# Patient Record
Sex: MALE | Race: WHITE | NOT HISPANIC OR LATINO | Employment: FULL TIME | URBAN - METROPOLITAN AREA
[De-identification: names, ages, dates, MRNs, and addresses within clinical notes are randomized per-mention and may not be internally consistent; named-entity substitution may affect disease eponyms.]

---

## 2017-02-01 LAB
ALBUMIN SERPL-MCNC: 4.3 G/DL (ref 3.5–5.5)
ALBUMIN/GLOB SERPL: 1.4 {RATIO} (ref 1.1–2.5)
ALP SERPL-CCNC: 71 IU/L (ref 39–117)
ALT SERPL-CCNC: 35 IU/L (ref 0–44)
AST SERPL-CCNC: 72 IU/L (ref 0–40)
BASOPHILS # BLD AUTO: 0 X10E3/UL (ref 0–0.2)
BASOPHILS NFR BLD AUTO: 0 %
BILIRUB SERPL-MCNC: 0.5 MG/DL (ref 0–1.2)
BUN SERPL-MCNC: 15 MG/DL (ref 6–24)
BUN/CREAT SERPL: 19 (ref 9–20)
CALCIUM SERPL-MCNC: 9.4 MG/DL (ref 8.7–10.2)
CHLORIDE SERPL-SCNC: 101 MMOL/L (ref 96–106)
CHOLEST SERPL-MCNC: 158 MG/DL (ref 100–199)
CO2 SERPL-SCNC: 22 MMOL/L (ref 18–29)
COMMENT 011824: NORMAL
CREAT SERPL-MCNC: 0.8 MG/DL (ref 0.76–1.27)
EOSINOPHIL # BLD AUTO: 0.4 X10E3/UL (ref 0–0.4)
EOSINOPHIL NFR BLD AUTO: 5 %
ERYTHROCYTE [DISTWIDTH] IN BLOOD BY AUTOMATED COUNT: 14.3 % (ref 12.3–15.4)
GLOBULIN SER CALC-MCNC: 3 G/DL (ref 1.5–4.5)
GLUCOSE SERPL-MCNC: 104 MG/DL (ref 65–99)
HBA1C MFR BLD: 5.8 % (ref 4.8–5.6)
HCT VFR BLD AUTO: 42.1 % (ref 37.5–51)
HDLC SERPL-MCNC: 41 MG/DL
HGB BLD-MCNC: 14.1 G/DL (ref 12.6–17.7)
IMM GRANULOCYTES # BLD: 0 X10E3/UL (ref 0–0.1)
IMM GRANULOCYTES NFR BLD: 0 %
IMMATURE CELLS  115398: NORMAL
LDLC SERPL CALC-MCNC: 97 MG/DL (ref 0–99)
LYMPHOCYTES # BLD AUTO: 1.6 X10E3/UL (ref 0.7–3.1)
LYMPHOCYTES NFR BLD AUTO: 24 %
MCH RBC QN AUTO: 27.4 PG (ref 26.6–33)
MCHC RBC AUTO-ENTMCNC: 33.5 G/DL (ref 31.5–35.7)
MCV RBC AUTO: 82 FL (ref 79–97)
MONOCYTES # BLD AUTO: 0.6 X10E3/UL (ref 0.1–0.9)
MONOCYTES NFR BLD AUTO: 9 %
MORPHOLOGY BLD-IMP: NORMAL
NEUTROPHILS # BLD AUTO: 4.1 X10E3/UL (ref 1.4–7)
NEUTROPHILS NFR BLD AUTO: 62 %
NRBC BLD AUTO-RTO: NORMAL %
PLATELET # BLD AUTO: 168 X10E3/UL (ref 150–379)
POTASSIUM SERPL-SCNC: 4 MMOL/L (ref 3.5–5.2)
PROT SERPL-MCNC: 7.3 G/DL (ref 6–8.5)
RBC # BLD AUTO: 5.15 X10E6/UL (ref 4.14–5.8)
SODIUM SERPL-SCNC: 142 MMOL/L (ref 134–144)
TRIGL SERPL-MCNC: 101 MG/DL (ref 0–149)
VLDLC SERPL CALC-MCNC: 20 MG/DL (ref 5–40)
WBC # BLD AUTO: 6.8 X10E3/UL (ref 3.4–10.8)

## 2017-02-01 NOTE — PROGRESS NOTES
Quick Note:    I will discuss the result in upcoming appointment.     Veronica Lockhart MD    ______

## 2017-02-06 ENCOUNTER — OFFICE VISIT (OUTPATIENT)
Dept: MEDICAL GROUP | Age: 42
End: 2017-02-06
Payer: COMMERCIAL

## 2017-02-06 VITALS
WEIGHT: 307 LBS | BODY MASS INDEX: 43.95 KG/M2 | HEIGHT: 70 IN | OXYGEN SATURATION: 99 % | DIASTOLIC BLOOD PRESSURE: 88 MMHG | SYSTOLIC BLOOD PRESSURE: 122 MMHG | TEMPERATURE: 97.3 F | HEART RATE: 89 BPM

## 2017-02-06 DIAGNOSIS — R21 PERIANAL RASH: ICD-10-CM

## 2017-02-06 DIAGNOSIS — R73.03 PREDIABETES: ICD-10-CM

## 2017-02-06 DIAGNOSIS — E66.01 MORBID OBESITY WITH BMI OF 40.0-44.9, ADULT (HCC): ICD-10-CM

## 2017-02-06 DIAGNOSIS — R73.01 IFG (IMPAIRED FASTING GLUCOSE): ICD-10-CM

## 2017-02-06 DIAGNOSIS — E78.00 PURE HYPERCHOLESTEROLEMIA: ICD-10-CM

## 2017-02-06 DIAGNOSIS — I10 ESSENTIAL HYPERTENSION: ICD-10-CM

## 2017-02-06 DIAGNOSIS — K21.9 GASTROESOPHAGEAL REFLUX DISEASE WITHOUT ESOPHAGITIS: ICD-10-CM

## 2017-02-06 PROCEDURE — 99214 OFFICE O/P EST MOD 30 MIN: CPT | Performed by: INTERNAL MEDICINE

## 2017-02-06 RX ORDER — OMEPRAZOLE 40 MG/1
40 CAPSULE, DELAYED RELEASE ORAL DAILY
Refills: 4 | COMMUNITY
Start: 2017-01-31 | End: 2018-04-12

## 2017-02-06 RX ORDER — SIMVASTATIN 10 MG
10 TABLET ORAL EVERY EVENING
Qty: 90 TAB | Refills: 3 | Status: SHIPPED | OUTPATIENT
Start: 2017-02-06

## 2017-02-06 RX ORDER — SUCRALFATE 1 G/1
TABLET ORAL
Refills: 4 | COMMUNITY
Start: 2017-01-31 | End: 2018-06-21

## 2017-02-06 ASSESSMENT — PATIENT HEALTH QUESTIONNAIRE - PHQ9: CLINICAL INTERPRETATION OF PHQ2 SCORE: 0

## 2017-02-06 ASSESSMENT — PAIN SCALES - GENERAL: PAINLEVEL: NO PAIN

## 2017-02-06 NOTE — MR AVS SNAPSHOT
"Jose De Jesus Jenkins   2017 2:00 PM   Office Visit   MRN: [id number]    Department:  [address] [place] [address]   Dept Phone:  [phone]    Description:  Male : 1975   Provider:  Veronica Lockhart M.D.           Reason for Visit     Follow-Up lab review    Rash x perianal area 2 months      Allergies as of 2017     Allergen Noted Reactions    Shellfish Allergy 10/03/2012       No Known Drug Allergy 2010         You were diagnosed with     Pure hypercholesterolemia   [272.0.ICD-9-CM]       Gastroesophageal reflux disease without esophagitis   [971441]       Prediabetes   [763451]       Essential hypertension   [2024318]       Morbid obesity with BMI of 40.0-44.9, adult (CMS-HCC)   [479123]       IFG (impaired fasting glucose)   [740588]         Vital Signs     Blood Pressure Pulse Temperature Height Weight Body Mass Index    122/88 mmHg 113 36.3 °C (97.4 °F) 1.778 m (5' 10\") 139.254 kg (307 lb) 44.05 kg/m2    Oxygen Saturation Smoking Status                99% Never Smoker           Basic Information     Date Of Birth Sex Race Ethnicity Preferred Language    1975 Male White Non- English      Your appointments     Aug 07, 2017  1:00 PM   Established Patient with Veronica Lockhart M.D.   [address] [id number] [phone]           You will be receiving a confirmation call a few days before your appointment from our automated call confirmation system.              Problem List              ICD-10-CM Priority Class Noted - Resolved    Deviated nasal septum J34.2   10/24/2012 - Present    Pure hypercholesterolemia E78.00   2015 - Present    BENITA on CPAP G47.33   2015 - Present    Morbid obesity with BMI of 40.0-44.9, adult (CMS-HCC) E66.01, Z68.41   2015 - Present    Family history of diabetes mellitus Z83.3   2015 - Present    Prediabetes R73.03   2015 - Present    Essential hypertension I10   " 4/4/2016 - Present    Chronic allergic rhinitis J30.9   11/3/2016 - Present    Gastroesophageal reflux disease without esophagitis K21.9   2/6/2017 - Present      Health Maintenance        Date Due Completion Dates    IMM DTaP/Tdap/Td Vaccine (2 - Td) 11/6/2025 11/6/2015            Current Immunizations     Influenza Vaccine Quad Inj (Pf) 11/6/2015    Influenza Vaccine Quad Inj (Preserved) 11/3/2016  1:45 PM    Tdap Vaccine 11/6/2015      Below and/or attached are the medications your provider expects you to take. Review all of your home medications and newly ordered medications with your provider and/or pharmacist. Follow medication instructions as directed by your provider and/or pharmacist. Please keep your medication list with you and share with your provider. Update the information when medications are discontinued, doses are changed, or new medications (including over-the-counter products) are added; and carry medication information at all times in the event of emergency situations     Allergies:  SHELLFISH ALLERGY - (reactions not documented)     NO KNOWN DRUG ALLERGY - (reactions not documented)               Medications  Valid as of: February 06, 2017 -  2:48 PM    Generic Name Brand Name Tablet Size Instructions for use    Ascorbic Acid (Tab) Vitamin C 1000 MG Take  by mouth every day.          Fluticasone Propionate HFA (Aerosol) FLOVENT  MCG/ACT Inhale 2 Puffs by mouth 2 times a day.          Lisinopril-Hydrochlorothiazide (Tab) PRINZIDE, ZESTORETIC 10-12.5 MG Take 1 Tab by mouth every day.        Omeprazole (CAPSULE DELAYED RELEASE) PRILOSEC 40 MG Take 40 mg by mouth every day.        Simvastatin (Tab) ZOCOR 20 MG TAKE 1 TAB BY MOUTH EVERY EVENING.        Sucralfate (Tab) CARAFATE 1 GM TAKE 1 TABLET BY MOUTH (MIX WITH WATER TO MAKE A SLURRY) 4 TIMES A DAY FOR 5 WEEKS        .                 Medicines prescribed today were sent to:     [facility] #[id number] - [place] - [address]    [address] [address]    Phone: [phone] Fax: [phone]    Open 24 Hours?: No      Medication refill instructions:       If your prescription bottle indicates you have medication refills left, it is not necessary to call your provider’s office. Please contact your pharmacy and they will refill your medication.    If your prescription bottle indicates you do not have any refills left, you may request refills at any time through one of the following ways: The online Filtr8 system (except Urgent Care), by calling your provider’s office, or by asking your pharmacy to contact your provider’s office with a refill request. Medication refills are processed only during regular business hours and may not be available until the next business day. Your provider may request additional information or to have a follow-up visit with you prior to refilling your medication.   *Please Note: Medication refills are assigned a new Rx number when refilled electronically. Your pharmacy may indicate that no refills were authorized even though a new prescription for the same medication is available at the pharmacy. Please request the medicine by name with the pharmacy before contacting your provider for a refill.        Your To Do List     Future Labs/Procedures Complete By Expires    COMP METABOLIC PANEL  As directed 2/7/2018    HEMOGLOBIN A1C  As directed 2/7/2018    LIPID PROFILE  As directed 2/7/2018         Filtr8 Access Code: Activation code not generated  Current Filtr8 Status: Active

## 2017-02-06 NOTE — ASSESSMENT & PLAN NOTE
Patient reported rash around perianal area for 2 months. He feels pain when he wipes after bowel movement. He noticed black spot on the toilet paper but denied blood on toilet bowl or bloody bowel movement. He denied constipation. He uses over-the-counter Neosporin last week.

## 2017-02-06 NOTE — ASSESSMENT & PLAN NOTE
His blood pressure is well-controlled with lisinopril-hydrochlorothiazide 10-12.5 mg daily. Electrolytes and renal function are within normal. He denies side effects from taking it.

## 2017-02-06 NOTE — Clinical Note
[facility]  Veronica Lockhart M.D.  [address]  [place] [address]  Fax: [phone] Authorization for Release/Disclosure of Protected Health Information   Name: LEIGH NASCIMENTO : 1975 SSN: [id number]   Address: [place] [address] Phone:    [phone] (home)    I authorize the entity listed below to release/disclose the PHI below to [facility]/Veronica Lockhart M.D.   Provider or Entity Name:    [facility]   Address   City, State, [facility]   Phone:      Fax:  [phone]     Reason for request: continuity of care   Information to be released: Last Endoscopy   [  ] LAST COLONOSCOPY, including any PATH REPORT [  ] LAST DEXA  [  ] LAST MAMMOGRAM  [  ] LAST PAP [  ] RETINA EXAM REPORT  [  ] IMMUNIZATION RECORDS  [  ] Release all info      [  ] Check here and initial the line next to each item to release ALL health information INCLUDING  _____ Care and treatment for drug and / or alcohol abuse  _____ HIV testing, infection status, or AIDS  _____ Genetic Testing    DATES OF SERVICE OR TIME PERIOD TO BE DISCLOSED: _____2017________  I understand and acknowledge that:  * This Authorization may be revoked at any time by you in writing, except if your health information has already been used or disclosed.  * Your health information that will be used or disclosed as a result of you signing this authorization could be re-disclosed by the recipient. If this occurs, your re-disclosed health information may no longer be protected by State or Federal laws.  * You may refuse to sign this Authorization. Your refusal will not affect your ability to obtain treatment.  * This Authorization becomes effective upon signing and will  on (date) __________. If no date is indicated, this Authorization will  one (1) year from the signature date.    Name: Leigh Nascimento    Signature:     Date: 2017

## 2017-02-06 NOTE — ASSESSMENT & PLAN NOTE
Patient has EGD and dilation with [facility] on 1/31/17. He was prescribed omeprazole and sucralfate from [facility]. He is taking omeprazole 40 mg every morning and sucralfate 1 g twice a day. He reported that he swallows better after dilation. He has EGD 3 times since 2011.

## 2017-02-06 NOTE — ASSESSMENT & PLAN NOTE
Patient is taking simvastatin 20 mg every afternoon. He denies side effects from taking it. He has slightly elevated AST at 72 on 1/31/17. His cholesterol is well-controlled with current regimens and diet and exercise. We discussed to cut down simvastatin to 10 mg every afternoon. Patient agreed with the plan.    Results for LEIGH NASCIMENTO (MRN [id number]) as of 2/6/2017 15:24   Ref. Range 1/31/2017 08:10   Cholesterol,Tot Latest Ref Range: 100-199 mg/dL 158   Triglycerides Latest Ref Range: 0-149 mg/dL 101   HDL Latest Ref Range: >39 mg/dL 41   LDL Latest Ref Range: 0-99 mg/dL 97   VLDL Cholesterol Calc Latest Ref Range: 5-40 mg/dL 20

## 2017-02-06 NOTE — ASSESSMENT & PLAN NOTE
His fasting blood sugar improved to 104 and A1c 5.8 on 1/31/17. He has been controlled carbohydrate intake.

## 2017-02-06 NOTE — ASSESSMENT & PLAN NOTE
Patient had 45 minutes of cardio exercise 5-6 times a week. He lost 10-12 pounds since August 2016. He also tried to eat healthy diet.

## 2017-02-07 NOTE — PROGRESS NOTES
Subjective:   Jose De Jesus Nascimento is a 41 y.o. male here today for evaluation and management of:      Pure hypercholesterolemia  Patient is taking simvastatin 20 mg every afternoon. He denies side effects from taking it. He has slightly elevated AST at 72 on 1/31/17. His cholesterol is well-controlled with current regimens and diet and exercise. We discussed to cut down simvastatin to 10 mg every afternoon. Patient agreed with the plan.    Results for JOSE DE JESUS NASCIMENTO (MRN [id number]) as of 2/6/2017 15:24   Ref. Range 1/31/2017 08:10   Cholesterol,Tot Latest Ref Range: 100-199 mg/dL 158   Triglycerides Latest Ref Range: 0-149 mg/dL 101   HDL Latest Ref Range: >39 mg/dL 41   LDL Latest Ref Range: 0-99 mg/dL 97   VLDL Cholesterol Calc Latest Ref Range: 5-40 mg/dL 20       Morbid obesity with BMI of 40.0-44.9, adult (CMS-HCC)  Patient had 45 minutes of cardio exercise 5-6 times a week. He lost 10-12 pounds since August 2016. He also tried to eat healthy diet.    Prediabetes  His fasting blood sugar improved to 104 and A1c 5.8 on 1/31/17. He has been controlled carbohydrate intake.    Essential hypertension  His blood pressure is well-controlled with lisinopril-hydrochlorothiazide 10-12.5 mg daily. Electrolytes and renal function are within normal. He denies side effects from taking it.    Gastroesophageal reflux disease without esophagitis  Patient has EGD and dilation with [facility] on 1/31/17. He was prescribed omeprazole and sucralfate from [facility]. He is taking omeprazole 40 mg every morning and sucralfate 1 g twice a day. He reported that he swallows better after dilation. He has EGD 3 times since 2011.    Perianal rash  Patient reported rash around perianal area for 2 months. He feels pain when he wipes after bowel movement. He noticed black spot on the toilet paper but denied blood on toilet bowl or bloody bowel movement. He denied constipation. He uses over-the-counter Neosporin last week.      "      Current medicines (including changes today)  Current Outpatient Prescriptions   Medication Sig Dispense Refill   • omeprazole (PRILOSEC) 40 MG delayed-release capsule Take 40 mg by mouth every day.  4   • sucralfate (CARAFATE) 1 GM Tab TAKE 1 TABLET BY MOUTH (MIX WITH WATER TO MAKE A SLURRY) 4 TIMES A DAY FOR 5 WEEKS  4   • simvastatin (ZOCOR) 10 MG Tab Take 1 Tab by mouth every evening. TAKE 1 TAB BY MOUTH EVERY EVENING. 90 Tab 3   • lisinopril-hydrochlorothiazide (PRINZIDE, ZESTORETIC) 10-12.5 MG per tablet Take 1 Tab by mouth every day. 30 Tab 6   • Ascorbic Acid (VITAMIN C) 1000 MG TABS Take  by mouth every day.       • fluticasone (FLOVENT HFA) 220 MCG/ACT AERO Inhale 2 Puffs by mouth 2 times a day.         No current facility-administered medications for this visit.     He  has a past medical history of Seasonal allergies; Hyperlipidemia; and Snoring.    ROS   No chest pain, no shortness of breath, no abdominal pain       Objective:     Blood pressure 122/88, pulse 89, temperature 36.3 °C (97.3 °F), height 1.778 m (5' 10\"), weight 139.254 kg (307 lb), SpO2 99 %. Body mass index is 44.05 kg/(m^2).   Physical Exam:  General: Alert, oriented and no acute distress.  Eye contact is good, speech goal directed, affect calm  HEENT: conjunctiva non-injected, sclera non-icteric.  Oral mucous membranes pink and moist with no lesions.  Pinna normal.  Lungs: Normal respiratory effort, clear to auscultation bilaterally with good excursion.  CV: regular rate and rhythm. No murmurs.  Abdomen: soft, non distended, nontender, Bowel sound normal.  Ext: no edema, color normal, vascularity normal, temperature normal  Perianal exam: mild erythematous rash around anal region, small superficial tear at 12 o' clock position above the anus, no anal fissure or fistula.     Assessment and Plan:   The following treatment plan was discussed     1. Pure hypercholesterolemia  - Cholesterol improved. Decrease the dose of simvastatin " from 20-10 mg every afternoon due to slightly elevated AST. Recommended to avoid alcohol.  - Advised to eat low fat, low carbohydrate and high fiber diet as well as do cardio physical exercise regularly.   - COMP METABOLIC PANEL; Future  - LIPID PROFILE; Future  - simvastatin (ZOCOR) 10 MG Tab; Take 1 Tab by mouth every evening. TAKE 1 TAB BY MOUTH EVERY EVENING.  Dispense: 90 Tab; Refill: 3    2. Gastroesophageal reflux disease without esophagitis  - Status post EGD and dilation on 1/31/17 with digestive health. Continue omeprazole and sucralfate as instructed by digestive health.  - Recommend to avoid acidic food and eating late in the evening. Elevated head of the bed.  - Encouraged to lose weight.  - COMP METABOLIC PANEL; Future    3. Prediabetes  - A1c improved. Counseling for diabetes diet and regular exercise. Will recheck lab in 6 months.  - HEMOGLOBIN A1C; Future    4. Essential hypertension  - Well-controlled. Continue current regimens. Recheck lab 1-2 weeks before next follow up visit.  - COMP METABOLIC PANEL; Future    5. Morbid obesity with BMI of 40.0-44.9, adult (CMS-[facility])  - Patient lost some weight from diet and exercise. Counseled for healthy diet and regular physical exercise to lose weight. Discussed to restrict calorie intake.  - Patient identified as having weight management issue.  Appropriate orders and counseling given.    6. IFG (impaired fasting glucose)  - HEMOGLOBIN A1C; Future    7. Perianal rash  - No signs of infection. It is likely due to moisture and body habitus.  - He declined to use hydrocortisone cream. He wanted to try Neosporin for superficial tear on perianal region.  - Discussed to do Sitz bath, wash with soap and water after bowel movement. He can try Neosporin topical.   - Advised to keep perianal dry and clean.       Followup: Return in about 6 months (around 8/6/2017), or if symptoms worsen or fail to improve, for hypertension, hypercholesterolemia, prediabetic, obesity,  sleep apnea, GERD.      Please note that this dictation was created using voice recognition software. I have made every reasonable attempt to correct obvious errors, but I expect that there may have unintended errors in text, spelling, punctuation, or grammar that I did not discover.

## 2017-05-20 ENCOUNTER — HOSPITAL ENCOUNTER (OUTPATIENT)
Dept: RADIOLOGY | Facility: MEDICAL CENTER | Age: 42
End: 2017-05-20
Attending: SURGERY
Payer: COMMERCIAL

## 2017-05-20 DIAGNOSIS — S46.011A STRAIN OF MUSCLE(S) AND TENDON(S) OF THE ROTATOR CUFF OF RIGHT SHOULDER, INITIAL ENCOUNTER: ICD-10-CM

## 2017-05-20 DIAGNOSIS — M25.511 RIGHT SHOULDER PAIN, UNSPECIFIED CHRONICITY: ICD-10-CM

## 2017-05-20 DIAGNOSIS — M75.41 IMPINGEMENT SYNDROME OF RIGHT SHOULDER: ICD-10-CM

## 2017-05-20 PROCEDURE — 73221 MRI JOINT UPR EXTREM W/O DYE: CPT | Mod: RT

## 2017-05-22 DIAGNOSIS — I10 ESSENTIAL HYPERTENSION: ICD-10-CM

## 2017-05-22 RX ORDER — LISINOPRIL AND HYDROCHLOROTHIAZIDE 12.5; 1 MG/1; MG/1
TABLET ORAL
Qty: 30 TAB | Refills: 11 | Status: SHIPPED | OUTPATIENT
Start: 2017-05-22

## 2017-08-04 ENCOUNTER — TELEPHONE (OUTPATIENT)
Dept: MEDICAL GROUP | Age: 42
End: 2017-08-04

## 2017-11-03 ENCOUNTER — APPOINTMENT (OUTPATIENT)
Dept: SLEEP MEDICINE | Facility: MEDICAL CENTER | Age: 42
End: 2017-11-03
Payer: COMMERCIAL

## 2017-12-22 ENCOUNTER — SLEEP CENTER VISIT (OUTPATIENT)
Dept: SLEEP MEDICINE | Facility: MEDICAL CENTER | Age: 42
End: 2017-12-22
Payer: COMMERCIAL

## 2017-12-22 VITALS
WEIGHT: 315 LBS | HEART RATE: 76 BPM | SYSTOLIC BLOOD PRESSURE: 128 MMHG | RESPIRATION RATE: 15 BRPM | OXYGEN SATURATION: 94 % | HEIGHT: 70 IN | BODY MASS INDEX: 45.1 KG/M2 | DIASTOLIC BLOOD PRESSURE: 82 MMHG

## 2017-12-22 DIAGNOSIS — J34.2 DEVIATED NASAL SEPTUM: ICD-10-CM

## 2017-12-22 DIAGNOSIS — E66.01 MORBID OBESITY WITH BMI OF 40.0-44.9, ADULT (HCC): ICD-10-CM

## 2017-12-22 DIAGNOSIS — G47.33 OSA ON CPAP: ICD-10-CM

## 2017-12-22 DIAGNOSIS — J30.9 CHRONIC ALLERGIC RHINITIS, UNSPECIFIED SEASONALITY, UNSPECIFIED TRIGGER: ICD-10-CM

## 2017-12-22 PROCEDURE — 99203 OFFICE O/P NEW LOW 30 MIN: CPT | Performed by: NURSE PRACTITIONER

## 2017-12-22 NOTE — PROGRESS NOTES
Chief Complaint   Patient presents with   • New Patient     DR SOFIA / NEW PT          HPI: This patient is a 42 y.o. male, who presents To reestablish care for known sleep apnea. He is a former patient of Dr. Sofia. Prior UPPP.  His history of severe obstructive sleep apnea and is compliant with CPAP 11 cm H2O. Compliance download shows 100% usage for the past 30 days, average use of 7 hours 20 minutes per night, AHI reduced to 0.2. Patient's mask and pressures are comfortable. He feels he benefits from therapy. Weeks feeling rested, denies daytime hypersomnolence or a.m. headache. He is receiving supplies regularly through [facility].     He has chronic sinus disease, nasal congestion. He's had a prior nasal surgery with Dr. Asher which was ineffective. He uses saline rinse and nasal steroid with minimal improvement.      Past Medical History:   Diagnosis Date   • Apnea, sleep    • Asthma    • Frequent urination    • Hyperlipidemia    • Seasonal allergies    • Sleep apnea    • Snoring        Social History   Substance Use Topics   • Smoking status: Never Smoker   • Smokeless tobacco: Current User     Types: Chew   • Alcohol use Yes      Comment: 5 a week        Family History   Problem Relation Age of Onset   • Diabetes Mother      insulin-dependent diabetes   • Stroke Father    • Heart Disease Father      AICD placement   • Diabetes Daughter      Type I DM       Current medications as of today   Current Outpatient Prescriptions   Medication Sig Dispense Refill   • Loratadine (CLARITIN PO) Take  by mouth.     • lisinopril-hydrochlorothiazide (PRINZIDE, ZESTORETIC) 10-12.5 MG per tablet TAKE 1 TAB BY MOUTH EVERY DAY. 30 Tab 11   • simvastatin (ZOCOR) 10 MG Tab Take 1 Tab by mouth every evening. TAKE 1 TAB BY MOUTH EVERY EVENING. 90 Tab 3   • Ascorbic Acid (VITAMIN C) 1000 MG TABS Take  by mouth every day.       • omeprazole (PRILOSEC) 40 MG delayed-release capsule Take 40 mg by mouth every day.  4   • sucralfate  "(CARAFATE) 1 GM Tab TAKE 1 TABLET BY MOUTH (MIX WITH WATER TO MAKE A SLURRY) 4 TIMES A DAY FOR 5 WEEKS  4   • fluticasone (FLOVENT HFA) 220 MCG/ACT AERO Inhale 2 Puffs by mouth 2 times a day.         No current facility-administered medications for this visit.        Allergies: Shellfish allergy and No known drug allergy    Blood pressure 128/82, pulse 76, resp. rate 15, height 1.778 m (5' 10\"), weight (!) 147 kg (324 lb), SpO2 94 %.      ROS:   Constitutional: Denies fevers, chills, night sweats, weight loss or fatigue  Eyes: Denies pain, discharge/drainage  ENT: Denies tinnitus, hearing loss, hoarseness, epistaxis. Positive rhinitis and nasal congestion.  Respiratory: Denies cough, wheeze, dyspnea, hemoptysis  Cardiovascular: Denies chest pain, tightness, palpitations, orthopnea or edema  Sleep: See HPI  Musculoskeletal: Denies back pain, painful joints, sore muscles  Neurological: Denies vertigo or headaches  Skin: Denies rashes, lesions  Psychiatric: Denies depression or anxiety    Physical exam:   Constitutional: Obese, in no acute distress  Eyes: PERRL  Mouth/Throat: Oropharynx is moist, clear, no lesions, prior UPPP  Neck: supple, no masses  Respiratory: no intercostal retractions or accessory muscle use   Lungs auscultation: Clear to auscultation bilaterally  Cardiovascular: Regular rate rhythm no murmurs, rubs or gallops  Musculoskeletal:  no clubbing or cyanosis  Skin: No rashes or lesions noted on exposed skin  Neuro: No focal deficit noted  Psychiatric: Oriented to time, person and place.     Diagnosis:  1. Deviated nasal septum  REFERRAL TO ENT   2. Chronic allergic rhinitis, unspecified seasonality, unspecified trigger  REFERRAL TO ENT   3. BENITA on CPAP  DME MASK AND SUPPLIES   4. Morbid obesity with BMI of 40.0-44.9, adult (CMS-[facility])         Plan:  1.Continue CPAP nightly  2. Clean mask and tubing weekly  3. Prescription for supplies to [facility]  4. Referral to ENT  5. Follow up annually    "

## 2018-01-25 DIAGNOSIS — E78.00 PURE HYPERCHOLESTEROLEMIA: ICD-10-CM

## 2018-01-26 NOTE — TELEPHONE ENCOUNTER
I have not seen patient about a year. Please confirm with patient if he already establish to new PCP. I will hold off refill on simvastatin as he needs to refill it from his current PCP.

## 2018-01-30 RX ORDER — SIMVASTATIN 10 MG
TABLET ORAL
Qty: 90 TAB | Refills: 0 | OUTPATIENT
Start: 2018-01-30

## 2018-01-30 NOTE — TELEPHONE ENCOUNTER
Phone Number Called: [phone] (home)       Message: Spoke to patient regarding note below. Patient will be contacting PCP to get RX filled.    Left Message for patient to call back: no

## 2018-04-03 ENCOUNTER — HOSPITAL ENCOUNTER (OUTPATIENT)
Facility: MEDICAL CENTER | Age: 43
End: 2018-04-03
Attending: OTOLARYNGOLOGY | Admitting: OTOLARYNGOLOGY
Payer: COMMERCIAL

## 2018-04-12 VITALS — WEIGHT: 315 LBS | HEIGHT: 72 IN | BODY MASS INDEX: 42.66 KG/M2

## 2018-04-12 DIAGNOSIS — Z01.812 PRE-OPERATIVE LABORATORY EXAMINATION: ICD-10-CM

## 2018-04-12 DIAGNOSIS — Z01.810 PRE-OPERATIVE CARDIOVASCULAR EXAMINATION: ICD-10-CM

## 2018-04-12 LAB
ANION GAP SERPL CALC-SCNC: 9 MMOL/L (ref 0–11.9)
BUN SERPL-MCNC: 15 MG/DL (ref 8–22)
CALCIUM SERPL-MCNC: 10.5 MG/DL (ref 8.5–10.5)
CHLORIDE SERPL-SCNC: 103 MMOL/L (ref 96–112)
CO2 SERPL-SCNC: 23 MMOL/L (ref 20–33)
CREAT SERPL-MCNC: 0.71 MG/DL (ref 0.5–1.4)
EKG IMPRESSION: NORMAL
GLUCOSE SERPL-MCNC: 190 MG/DL (ref 65–99)
POTASSIUM SERPL-SCNC: 3.6 MMOL/L (ref 3.6–5.5)
SODIUM SERPL-SCNC: 135 MMOL/L (ref 135–145)

## 2018-04-12 PROCEDURE — 93010 ELECTROCARDIOGRAM REPORT: CPT | Performed by: INTERNAL MEDICINE

## 2018-04-12 PROCEDURE — 80048 BASIC METABOLIC PNL TOTAL CA: CPT

## 2018-04-12 PROCEDURE — 36415 COLL VENOUS BLD VENIPUNCTURE: CPT

## 2018-04-12 PROCEDURE — 93005 ELECTROCARDIOGRAM TRACING: CPT

## 2018-04-12 RX ORDER — IBUPROFEN 800 MG/1
800 TABLET ORAL EVERY 8 HOURS PRN
COMMUNITY
End: 2018-04-17 | Stop reason: HOSPADM

## 2018-04-12 RX ORDER — HYDROCODONE BITARTRATE AND ACETAMINOPHEN 5; 325 MG/1; MG/1
1-2 TABLET ORAL EVERY 4 HOURS PRN
COMMUNITY
End: 2018-04-17 | Stop reason: HOSPADM

## 2018-04-24 ENCOUNTER — APPOINTMENT (RX ONLY)
Dept: URBAN - METROPOLITAN AREA CLINIC 4 | Facility: CLINIC | Age: 43
Setting detail: DERMATOLOGY
End: 2018-04-24

## 2018-04-24 DIAGNOSIS — Z71.89 OTHER SPECIFIED COUNSELING: ICD-10-CM

## 2018-04-24 DIAGNOSIS — B36.0 PITYRIASIS VERSICOLOR: ICD-10-CM

## 2018-04-24 DIAGNOSIS — D22 MELANOCYTIC NEVI: ICD-10-CM

## 2018-04-24 DIAGNOSIS — D18.0 HEMANGIOMA: ICD-10-CM

## 2018-04-24 DIAGNOSIS — L81.4 OTHER MELANIN HYPERPIGMENTATION: ICD-10-CM

## 2018-04-24 PROBLEM — D22.5 MELANOCYTIC NEVI OF TRUNK: Status: ACTIVE | Noted: 2018-04-24

## 2018-04-24 PROBLEM — D22.61 MELANOCYTIC NEVI OF RIGHT UPPER LIMB, INCLUDING SHOULDER: Status: ACTIVE | Noted: 2018-04-24

## 2018-04-24 PROBLEM — D22.62 MELANOCYTIC NEVI OF LEFT UPPER LIMB, INCLUDING SHOULDER: Status: ACTIVE | Noted: 2018-04-24

## 2018-04-24 PROBLEM — D22.71 MELANOCYTIC NEVI OF RIGHT LOWER LIMB, INCLUDING HIP: Status: ACTIVE | Noted: 2018-04-24

## 2018-04-24 PROBLEM — E78.5 HYPERLIPIDEMIA, UNSPECIFIED: Status: ACTIVE | Noted: 2018-04-24

## 2018-04-24 PROBLEM — D18.01 HEMANGIOMA OF SKIN AND SUBCUTANEOUS TISSUE: Status: ACTIVE | Noted: 2018-04-24

## 2018-04-24 PROBLEM — D48.5 NEOPLASM OF UNCERTAIN BEHAVIOR OF SKIN: Status: ACTIVE | Noted: 2018-04-24

## 2018-04-24 PROBLEM — I10 ESSENTIAL (PRIMARY) HYPERTENSION: Status: ACTIVE | Noted: 2018-04-24

## 2018-04-24 PROBLEM — D22.72 MELANOCYTIC NEVI OF LEFT LOWER LIMB, INCLUDING HIP: Status: ACTIVE | Noted: 2018-04-24

## 2018-04-24 PROBLEM — D22.39 MELANOCYTIC NEVI OF OTHER PARTS OF FACE: Status: ACTIVE | Noted: 2018-04-24

## 2018-04-24 PROCEDURE — ? COUNSELING

## 2018-04-24 PROCEDURE — ? BIOPSY BY SHAVE METHOD

## 2018-04-24 PROCEDURE — 99203 OFFICE O/P NEW LOW 30 MIN: CPT | Mod: 25

## 2018-04-24 PROCEDURE — 11101: CPT

## 2018-04-24 PROCEDURE — 11100: CPT

## 2018-04-24 ASSESSMENT — LOCATION SIMPLE DESCRIPTION DERM
LOCATION SIMPLE: INFERIOR FOREHEAD
LOCATION SIMPLE: LEFT UPPER BACK
LOCATION SIMPLE: CHEST
LOCATION SIMPLE: ABDOMEN
LOCATION SIMPLE: RIGHT LOWER BACK
LOCATION SIMPLE: RIGHT UPPER ARM
LOCATION SIMPLE: LEFT UPPER ARM
LOCATION SIMPLE: RIGHT FOREARM
LOCATION SIMPLE: LEFT CALF
LOCATION SIMPLE: RIGHT CALF
LOCATION SIMPLE: LEFT ELBOW
LOCATION SIMPLE: UPPER BACK
LOCATION SIMPLE: LEFT CHEEK

## 2018-04-24 ASSESSMENT — LOCATION DETAILED DESCRIPTION DERM
LOCATION DETAILED: LEFT MEDIAL MALAR CHEEK
LOCATION DETAILED: SUPERIOR THORACIC SPINE
LOCATION DETAILED: RIGHT SUPERIOR MEDIAL MIDBACK
LOCATION DETAILED: LEFT DISTAL CALF
LOCATION DETAILED: LEFT MEDIAL INFERIOR CHEST
LOCATION DETAILED: LEFT MEDIAL UPPER BACK
LOCATION DETAILED: RIGHT PROXIMAL CALF
LOCATION DETAILED: LEFT ELBOW
LOCATION DETAILED: LEFT DISTAL POSTERIOR UPPER ARM
LOCATION DETAILED: STERNUM
LOCATION DETAILED: RIGHT PROXIMAL POSTERIOR UPPER ARM
LOCATION DETAILED: RIGHT PROXIMAL DORSAL FOREARM
LOCATION DETAILED: EPIGASTRIC SKIN
LOCATION DETAILED: INFERIOR MID FOREHEAD

## 2018-04-24 ASSESSMENT — LOCATION ZONE DERM
LOCATION ZONE: FACE
LOCATION ZONE: TRUNK
LOCATION ZONE: ARM
LOCATION ZONE: LEG

## 2018-04-24 NOTE — PROCEDURE: BIOPSY BY SHAVE METHOD
Bill 29451 For Specimen Handling/Conveyance To Laboratory?: no
Post-Care Instructions: I reviewed with the patient in detail post-care instructions. Patient is to keep the biopsy site dry overnight, and then apply bacitracin twice daily until healed. Patient may apply hydrogen peroxide soaks to remove any crusting.
X Size Of Lesion In Cm: 0
Consent: Written consent was obtained and risks were reviewed including but not limited to scarring, infection, bleeding, scabbing, incomplete removal, nerve damage and allergy to anesthesia.
Cryotherapy Text: The wound bed was treated with cryotherapy after the biopsy was performed.
Curettage Text: The wound bed was treated with curettage after the biopsy was performed.
Anesthesia Volume In Cc: 1
Was A Bandage Applied: Yes
Biopsy Type: H and E
Lab: [id number]
Anesthesia Type: 1% lidocaine with 1:100,000 epinephrine and 408mcg clindamycin/ml and a 1:10 solution of 8.4% sodium bicarbonate
Dressing: bandage
Notification Instructions: Patient will be notified of biopsy results. However, patient instructed to call the office if not contacted within 2 weeks.
Wound Care: Vaseline
Biopsy Method: Personna blade
Billing Type: Third-Party Bill
Silver Nitrate Text: The wound bed was treated with silver nitrate after the biopsy was performed.
Type Of Destruction Used: Curettage
Hemostasis: Drysol
Lab Facility: 
Detail Level: Detailed
Electrodesiccation Text: The wound bed was treated with electrodesiccation after the biopsy was performed.
Electrodesiccation And Curettage Text: The wound bed was treated with electrodesiccation and curettage after the biopsy was performed.

## 2018-04-27 ENCOUNTER — APPOINTMENT (OUTPATIENT)
Dept: RADIOLOGY | Facility: MEDICAL CENTER | Age: 43
End: 2018-04-27
Attending: FAMILY MEDICINE
Payer: COMMERCIAL

## 2018-05-02 ENCOUNTER — APPOINTMENT (RX ONLY)
Dept: URBAN - METROPOLITAN AREA CLINIC 4 | Facility: CLINIC | Age: 43
Setting detail: DERMATOLOGY
End: 2018-05-02

## 2018-05-02 ENCOUNTER — HOSPITAL ENCOUNTER (OUTPATIENT)
Dept: RADIOLOGY | Facility: MEDICAL CENTER | Age: 43
End: 2018-05-02
Attending: FAMILY MEDICINE
Payer: COMMERCIAL

## 2018-05-02 DIAGNOSIS — R94.31 ABNORMAL EKG: ICD-10-CM

## 2018-05-02 PROCEDURE — ? MOHS SURGERY PHONE CONSULTATION

## 2018-05-02 PROCEDURE — 93017 CV STRESS TEST TRACING ONLY: CPT | Performed by: FAMILY MEDICINE

## 2018-05-03 PROBLEM — C44.91 BASAL CELL CARCINOMA OF SKIN, UNSPECIFIED: Status: ACTIVE | Noted: 2018-05-03

## 2018-05-03 NOTE — PROCEDURES
DATE OF SERVICE:  05/02/2018    EXERCISE TOLERANCE TEST REPORT    ORDERED BY:  Dr. Jonathan Guillaume.    INDICATION FOR PROCEDURE:  Preoperative clearance.    PROCEDURE:  After informed consent, the patient was exercised according to   Zeke protocol.  He completed 7 minutes and 31 seconds of exercise and   achieved 100% of his maximum predicted heart rate.  His resting   electrocardiogram showed a normal sinus rhythm with LVH by voltage criteria   and right axis deviation.  There is nonspecific ST segment changes in leads   III and aVF.  The patient developed no chest pain during exercise tolerance   testing.  He did develop 1-2 mm of flat to downsloping ST segment depression   in II, III and aVF in addition to V5 and V6.  The test was felt to be negative   for angina, but positive for ischemic EKG changes.    IMPRESSION:  1.  Exercise tolerance test negative for angina, but positive for ischemic   electrocardiogram changes.  2.  Fair exercise tolerance.  3.  Hypertensive blood pressure response to exercise.  The patient's peak   blood pressure was not recorded in stage III.  However, in stage II his blood   pressure was 180/100.  4.  Patient had some mild resting EKG abnormalities inferiorly and a   hypertensive blood pressure response.  This may contribute to his ischemic EKG   changes.  However, I would consider further evaluation with stress   echocardiography or myocardial perfusion imaging.       ____________________________________     MD BERTO GARCIA / KATERYNA    DD:  05/03/2018 09:02:33  DT:  05/03/2018 09:36:05    D#:  [id number]  Job#:  [id number]

## 2018-05-03 NOTE — PROCEDURE: MOHS SURGERY PHONE CONSULTATION
Does The Patient Take Blood Thinners?: No
Pathology Accession #: [id number]
Has The Pathology Report Been Received?: Yes
Time Of Mohs Surgery: 11:15am
Which Antibiotic Do They Take For Surgical Prophylaxis?: Amoxicillin (2 grams)
Date Of Mohs Surgery: 05/15/2018
Patient's Insurance: [facility]
Office Location Of Mohs Surgery: [facility]
Patient Reported Location: right superior occipital scalp
Referring Provider: Mamadou Disla PA-C
Detail Level: Simple

## 2018-05-09 ENCOUNTER — HOSPITAL ENCOUNTER (OUTPATIENT)
Dept: RADIOLOGY | Facility: MEDICAL CENTER | Age: 43
End: 2018-05-09
Attending: INTERNAL MEDICINE
Payer: COMMERCIAL

## 2018-05-09 ENCOUNTER — OFFICE VISIT (OUTPATIENT)
Dept: CARDIOLOGY | Facility: MEDICAL CENTER | Age: 43
End: 2018-05-09
Payer: COMMERCIAL

## 2018-05-09 VITALS
OXYGEN SATURATION: 93 % | BODY MASS INDEX: 45.1 KG/M2 | HEART RATE: 68 BPM | DIASTOLIC BLOOD PRESSURE: 90 MMHG | WEIGHT: 315 LBS | SYSTOLIC BLOOD PRESSURE: 122 MMHG | HEIGHT: 70 IN

## 2018-05-09 DIAGNOSIS — E78.00 PURE HYPERCHOLESTEROLEMIA: ICD-10-CM

## 2018-05-09 DIAGNOSIS — R94.39 ABNORMAL STRESS ECG WITH TREADMILL: ICD-10-CM

## 2018-05-09 DIAGNOSIS — R73.03 PREDIABETES: ICD-10-CM

## 2018-05-09 DIAGNOSIS — I10 ESSENTIAL HYPERTENSION: ICD-10-CM

## 2018-05-09 DIAGNOSIS — E66.01 MORBID OBESITY WITH BMI OF 40.0-44.9, ADULT (HCC): ICD-10-CM

## 2018-05-09 PROCEDURE — A9502 TC99M TETROFOSMIN: HCPCS

## 2018-05-09 PROCEDURE — 99204 OFFICE O/P NEW MOD 45 MIN: CPT | Performed by: INTERNAL MEDICINE

## 2018-05-09 RX ORDER — HYDROCODONE BITARTRATE AND ACETAMINOPHEN 5; 325 MG/1; MG/1
1-2 TABLET ORAL EVERY 4 HOURS PRN
Status: ON HOLD | COMMUNITY
End: 2018-08-15

## 2018-05-09 RX ORDER — IBUPROFEN 800 MG/1
800 TABLET ORAL EVERY 8 HOURS PRN
COMMUNITY

## 2018-05-09 ASSESSMENT — ENCOUNTER SYMPTOMS
DEPRESSION: 0
BLURRED VISION: 0
COUGH: 0
SHORTNESS OF BREATH: 0
FEVER: 0
PALPITATIONS: 0
PND: 0
HEADACHES: 0
BRUISES/BLEEDS EASILY: 0
NERVOUS/ANXIOUS: 0
NAUSEA: 0
HEARTBURN: 0
CHILLS: 0
EYE DISCHARGE: 0
MYALGIAS: 0
DIZZINESS: 0

## 2018-05-09 NOTE — LETTER
[facility]   [address]   [address]  [place] [address]  Phone: [phone]  Fax: [phone]              Jose De Jesus Fry Alice  1975    Encounter Date: 5/9/2018    Ashish Peace M.D.          PROGRESS NOTE:  No notes on file      Jonathan Guillaume M.D.  [address] [place] [id number]  VIA Facsimile: [phone]

## 2018-05-09 NOTE — PROGRESS NOTES
"No chief complaint on file.      Subjective:   Jose De Jesus Jenkins is a 43 y.o. male who presents today in consultation at the request of  for abnormal stress test.  This patient was scheduled for surgery for deviated nasal septum in hopes of getting off CPAP for sleep apnea.  Resting EKG demonstrated abnormal ST-T waves in leads 3 and aVF and borderline right axis deviation  The stress test demonstrated exercise-induced ST segment depression with persisting ST depression in the recovery phase.  The patient reports no symptoms that would suggest coronary heart disease.  He is quite compliant with CPAP for sleep apnea.  Is treated for hypertension.  Is treated for mild hypercholesterolemia.  pretreatment LDL is 149. Post treatment LDL is .    He has a busy  and has a family with children and does not do much physical exercise.  He states he gets \"a little short of breath\" when playing with his children.    Social history: No tobacco no alcohol abuse. He is . He is an . His children has type I diabetes  Past Medical History:   Diagnosis Date   • Apnea, sleep 04/2018    cpap in use   • Asthma    • Frequent urination    • Hyperlipidemia    • Seasonal allergies    • Sleep apnea    • Snoring      Past Surgical History:   Procedure Laterality Date   • SEPTOTURBINOPLASTY  10/24/2012    Performed by Jacinto Asher M.D. at SURGERY [facility]   • NASAL RECONSTRUCTION  10/24/2012    Performed by Jacinto Asher M.D. at SURGERY [facility]   • TONSILLECTOMY AND ADENOIDECTOMY  2009    [facility]     Family History   Problem Relation Age of Onset   • Diabetes Mother      insulin-dependent diabetes   • Stroke Father    • Heart Disease Father      AICD placement   • Diabetes Daughter      Type I DM     Social History     Social History   • Marital status:      Spouse name: N/A   • Number of children: N/A   • Years of education: N/A     Occupational History   • Not on file. "     Social History Main Topics   • Smoking status: Never Smoker   • Smokeless tobacco: Current User     Types: Chew   • Alcohol use Yes      Comment: 1 per week   • Drug use: No   • Sexual activity: Yes     Partners: Female     Other Topics Concern   • Not on file     Social History Narrative   • No narrative on file     Allergies   Allergen Reactions   • Shellfish Allergy Anaphylaxis   • No Known Drug Allergy      Outpatient Encounter Prescriptions as of 5/9/2018   Medication Sig Dispense Refill   • Triamcinolone Acetonide (NASACORT ALLERGY 24HR NA) Spray  in nose.     • multivitamin (THERAGRAN) Tab Take 1 Tab by mouth every day.     • Ascorbic Acid (VITAMIN C PO) Take  by mouth.     • HYDROcodone-acetaminophen (NORCO) 5-325 MG Tab per tablet Take 1-2 Tabs by mouth every four hours as needed.     • ibuprofen (MOTRIN) 800 MG Tab Take 800 mg by mouth every 8 hours as needed.     • Loratadine (CLARITIN PO) Take  by mouth.     • lisinopril-hydrochlorothiazide (PRINZIDE, ZESTORETIC) 10-12.5 MG per tablet TAKE 1 TAB BY MOUTH EVERY DAY. 30 Tab 11   • sucralfate (CARAFATE) 1 GM Tab TAKE 1 TABLET BY MOUTH (MIX WITH WATER TO MAKE A SLURRY) 4 TIMES A DAY FOR 5 WEEKS  4   • simvastatin (ZOCOR) 10 MG Tab Take 1 Tab by mouth every evening. TAKE 1 TAB BY MOUTH EVERY EVENING. 90 Tab 3   • fluticasone (FLOVENT HFA) 220 MCG/ACT AERO Inhale 2 Puffs by mouth 2 times a day.         No facility-administered encounter medications on file as of 5/9/2018.      Review of Systems   Constitutional: Negative for chills, fever and malaise/fatigue.   Eyes: Negative for blurred vision and discharge.   Respiratory: Negative for cough and shortness of breath.    Cardiovascular: Negative for chest pain, palpitations, leg swelling and PND.   Gastrointestinal: Negative for heartburn and nausea.   Genitourinary: Negative for dysuria and urgency.   Musculoskeletal: Negative for myalgias.   Skin: Negative for itching and rash.   Neurological: Negative  "for dizziness and headaches.   Endo/Heme/Allergies: Negative for environmental allergies. Does not bruise/bleed easily.   Psychiatric/Behavioral: Negative for depression. The patient is not nervous/anxious.         Objective:   /90   Pulse 68   Ht 1.778 m (5' 10\")   Wt (!) 144.7 kg (319 lb)   SpO2 93%   BMI 45.77 kg/m²     Physical Exam   Constitutional: He is oriented to person, place, and time.   Pleasant severely obese man with short neck   Neck: No JVD present.   Cardiovascular: Normal rate and regular rhythm.  Exam reveals no gallop and no friction rub.    No murmur heard.  Pulmonary/Chest: Effort normal and breath sounds normal.   Abdominal: Soft. Bowel sounds are normal.   Musculoskeletal: He exhibits no edema.   Neurological: He is alert and oriented to person, place, and time.   Skin: Skin is warm and dry.       Assessment:     1. Abnormal stress ECG with treadmill  NM-CARDIAC STRESS TEST    ECHOCARDIOGRAM COMP W/O CONT   2. Morbid obesity with BMI of 40.0-44.9, adult (CMS-[facility])     3. Pure hypercholesterolemia     4. Prediabetes     5. Essential hypertension         Medical Decision Making:  Today's Assessment / Status / Plan:   He is scheduled for treadmill myocardial perfusion imaging and echocardiogram with close follow-up thereafter.  Thank you for this consultation.  "

## 2018-05-10 ENCOUNTER — HOSPITAL ENCOUNTER (OUTPATIENT)
Dept: CARDIOLOGY | Facility: MEDICAL CENTER | Age: 43
End: 2018-05-10
Attending: INTERNAL MEDICINE
Payer: COMMERCIAL

## 2018-05-10 DIAGNOSIS — R94.39 ABNORMAL STRESS ECG WITH TREADMILL: ICD-10-CM

## 2018-05-10 LAB
LV EJECT FRACT MOD 2C 99903: 55.97
LV EJECT FRACT MOD 4C 99902: 60.37
LV EJECT FRACT MOD BP 99901: 57.06

## 2018-05-10 PROCEDURE — 93306 TTE W/DOPPLER COMPLETE: CPT | Mod: 26 | Performed by: INTERNAL MEDICINE

## 2018-05-10 PROCEDURE — 93306 TTE W/DOPPLER COMPLETE: CPT

## 2018-05-14 ENCOUNTER — OFFICE VISIT (OUTPATIENT)
Dept: CARDIOLOGY | Facility: MEDICAL CENTER | Age: 43
End: 2018-05-14
Payer: COMMERCIAL

## 2018-05-14 VITALS
HEART RATE: 74 BPM | OXYGEN SATURATION: 96 % | WEIGHT: 315 LBS | BODY MASS INDEX: 45.1 KG/M2 | DIASTOLIC BLOOD PRESSURE: 70 MMHG | SYSTOLIC BLOOD PRESSURE: 122 MMHG | HEIGHT: 70 IN

## 2018-05-14 DIAGNOSIS — E66.01 MORBID OBESITY WITH BMI OF 40.0-44.9, ADULT (HCC): ICD-10-CM

## 2018-05-14 DIAGNOSIS — R94.39 ABNORMAL STRESS ECG WITH TREADMILL: ICD-10-CM

## 2018-05-14 DIAGNOSIS — I10 ESSENTIAL HYPERTENSION: ICD-10-CM

## 2018-05-14 PROCEDURE — 99212 OFFICE O/P EST SF 10 MIN: CPT | Performed by: INTERNAL MEDICINE

## 2018-05-14 ASSESSMENT — ENCOUNTER SYMPTOMS
PND: 0
COUGH: 0
FEVER: 0
HEARTBURN: 0
BLURRED VISION: 0
DEPRESSION: 0
NAUSEA: 0
MYALGIAS: 0
SHORTNESS OF BREATH: 0
PALPITATIONS: 0
EYE DISCHARGE: 0
HEADACHES: 0
NERVOUS/ANXIOUS: 0
BRUISES/BLEEDS EASILY: 0
DIZZINESS: 0
CHILLS: 0

## 2018-05-14 NOTE — PROGRESS NOTES
Chief Complaint   Patient presents with   • Preoperative CV Eval       Subjective:   Jose De Jesus Jenkins is a 43 y.o. male who presents today for follow-up for abnormal stress EKG.  Subsequent testing revealed a normal treadmill MPI and normal echocardiogram.  He is therefore cleared for sinus surgery.    Past Medical History:   Diagnosis Date   • Apnea, sleep 04/2018    cpap in use   • Asthma    • Frequent urination    • Hyperlipidemia    • Seasonal allergies    • Sleep apnea    • Snoring      Past Surgical History:   Procedure Laterality Date   • SEPTOTURBINOPLASTY  10/24/2012    Performed by Jacinto Asher M.D. at SURGERY [facility]   • NASAL RECONSTRUCTION  10/24/2012    Performed by Jacinto Asher M.D. at SURGERY [facility]   • TONSILLECTOMY AND ADENOIDECTOMY  2009    [facility]     Family History   Problem Relation Age of Onset   • Diabetes Mother      insulin-dependent diabetes   • Stroke Father    • Heart Disease Father      AICD placement   • Diabetes Daughter      Type I DM     Social History     Social History   • Marital status:      Spouse name: N/A   • Number of children: N/A   • Years of education: N/A     Occupational History   • Not on file.     Social History Main Topics   • Smoking status: Never Smoker   • Smokeless tobacco: Current User     Types: Chew   • Alcohol use Yes      Comment: 1 per week   • Drug use: No   • Sexual activity: Yes     Partners: Female     Other Topics Concern   • Not on file     Social History Narrative   • No narrative on file     Allergies   Allergen Reactions   • Shellfish Allergy Anaphylaxis   • No Known Drug Allergy      Outpatient Encounter Prescriptions as of 5/14/2018   Medication Sig Dispense Refill   • Triamcinolone Acetonide (NASACORT ALLERGY 24HR NA) Spray  in nose.     • multivitamin (THERAGRAN) Tab Take 1 Tab by mouth every day.     • Ascorbic Acid (VITAMIN C PO) Take  by mouth.     • HYDROcodone-acetaminophen (NORCO) 5-325 MG Tab per tablet Take  "1-2 Tabs by mouth every four hours as needed.     • ibuprofen (MOTRIN) 800 MG Tab Take 800 mg by mouth every 8 hours as needed.     • Loratadine (CLARITIN PO) Take  by mouth.     • lisinopril-hydrochlorothiazide (PRINZIDE, ZESTORETIC) 10-12.5 MG per tablet TAKE 1 TAB BY MOUTH EVERY DAY. 30 Tab 11   • sucralfate (CARAFATE) 1 GM Tab TAKE 1 TABLET BY MOUTH (MIX WITH WATER TO MAKE A SLURRY) 4 TIMES A DAY FOR 5 WEEKS  4   • simvastatin (ZOCOR) 10 MG Tab Take 1 Tab by mouth every evening. TAKE 1 TAB BY MOUTH EVERY EVENING. 90 Tab 3   • fluticasone (FLOVENT HFA) 220 MCG/ACT AERO Inhale 2 Puffs by mouth 2 times a day.         No facility-administered encounter medications on file as of 5/14/2018.      Review of Systems   Constitutional: Negative for chills, fever and malaise/fatigue.   Eyes: Negative for blurred vision and discharge.   Respiratory: Negative for cough and shortness of breath.    Cardiovascular: Negative for chest pain, palpitations, leg swelling and PND.   Gastrointestinal: Negative for heartburn and nausea.   Genitourinary: Negative for dysuria and urgency.   Musculoskeletal: Negative for myalgias.   Skin: Negative for itching and rash.   Neurological: Negative for dizziness and headaches.   Endo/Heme/Allergies: Negative for environmental allergies. Does not bruise/bleed easily.   Psychiatric/Behavioral: Negative for depression. The patient is not nervous/anxious.         Objective:   /70   Pulse 74   Ht 1.778 m (5' 10\")   Wt (!) 144.7 kg (319 lb)   SpO2 96%   BMI 45.77 kg/m²     Physical Exam   Constitutional: He is oriented to person, place, and time.   Cardiovascular: Normal rate and regular rhythm.    Pulmonary/Chest: Effort normal.   Musculoskeletal: He exhibits no edema.   Neurological: He is alert and oriented to person, place, and time.   Skin: Skin is warm.       Assessment:     1. Abnormal stress ECG with treadmill     2. Morbid obesity with BMI of 40.0-44.9, adult (CMS-[facility])     3. " Essential hypertension         Medical Decision Making:  Today's Assessment / Status / Plan:   Low risk for sinus surgery and anesthesia.  Weight loss discussed  No need for follow-up here

## 2018-05-14 NOTE — LETTER
[facility]   [address]   [address]  [place] [address]  Phone: [phone]  Fax: [phone]              Jose De Jesus Jenkins  1975    Encounter Date: 5/14/2018    Ashish Peace M.D.          PROGRESS NOTE:  Chief Complaint   Patient presents with   • Preoperative CV Eval       Subjective:   Jose De Jesus Jenkins is a 43 y.o. male who presents today for follow-up for abnormal stress EKG.  Subsequent testing revealed a normal treadmill MPI and normal echocardiogram.  He is therefore cleared for sinus surgery.    Past Medical History:   Diagnosis Date   • Apnea, sleep 04/2018    cpap in use   • Asthma    • Frequent urination    • Hyperlipidemia    • Seasonal allergies    • Sleep apnea    • Snoring      Past Surgical History:   Procedure Laterality Date   • SEPTOTURBINOPLASTY  10/24/2012    Performed by Jacinto Asher M.D. at SURGERY [facility]   • NASAL RECONSTRUCTION  10/24/2012    Performed by Jacinto Asher M.D. at SURGERY [facility]   • TONSILLECTOMY AND ADENOIDECTOMY  2009    [facility]     Family History   Problem Relation Age of Onset   • Diabetes Mother      insulin-dependent diabetes   • Stroke Father    • Heart Disease Father      AICD placement   • Diabetes Daughter      Type I DM     Social History     Social History   • Marital status:      Spouse name: N/A   • Number of children: N/A   • Years of education: N/A     Occupational History   • Not on file.     Social History Main Topics   • Smoking status: Never Smoker   • Smokeless tobacco: Current User     Types: Chew   • Alcohol use Yes      Comment: 1 per week   • Drug use: No   • Sexual activity: Yes     Partners: Female     Other Topics Concern   • Not on file     Social History Narrative   • No narrative on file     Allergies   Allergen Reactions   • Shellfish Allergy Anaphylaxis   • No Known Drug Allergy      Outpatient Encounter Prescriptions as of  "5/14/2018   Medication Sig Dispense Refill   • Triamcinolone Acetonide (NASACORT ALLERGY 24HR NA) Spray  in nose.     • multivitamin (THERAGRAN) Tab Take 1 Tab by mouth every day.     • Ascorbic Acid (VITAMIN C PO) Take  by mouth.     • HYDROcodone-acetaminophen (NORCO) 5-325 MG Tab per tablet Take 1-2 Tabs by mouth every four hours as needed.     • ibuprofen (MOTRIN) 800 MG Tab Take 800 mg by mouth every 8 hours as needed.     • Loratadine (CLARITIN PO) Take  by mouth.     • lisinopril-hydrochlorothiazide (PRINZIDE, ZESTORETIC) 10-12.5 MG per tablet TAKE 1 TAB BY MOUTH EVERY DAY. 30 Tab 11   • sucralfate (CARAFATE) 1 GM Tab TAKE 1 TABLET BY MOUTH (MIX WITH WATER TO MAKE A SLURRY) 4 TIMES A DAY FOR 5 WEEKS  4   • simvastatin (ZOCOR) 10 MG Tab Take 1 Tab by mouth every evening. TAKE 1 TAB BY MOUTH EVERY EVENING. 90 Tab 3   • fluticasone (FLOVENT HFA) 220 MCG/ACT AERO Inhale 2 Puffs by mouth 2 times a day.         No facility-administered encounter medications on file as of 5/14/2018.      Review of Systems   Constitutional: Negative for chills, fever and malaise/fatigue.   Eyes: Negative for blurred vision and discharge.   Respiratory: Negative for cough and shortness of breath.    Cardiovascular: Negative for chest pain, palpitations, leg swelling and PND.   Gastrointestinal: Negative for heartburn and nausea.   Genitourinary: Negative for dysuria and urgency.   Musculoskeletal: Negative for myalgias.   Skin: Negative for itching and rash.   Neurological: Negative for dizziness and headaches.   Endo/Heme/Allergies: Negative for environmental allergies. Does not bruise/bleed easily.   Psychiatric/Behavioral: Negative for depression. The patient is not nervous/anxious.         Objective:   /70   Pulse 74   Ht 1.778 m (5' 10\")   Wt (!) 144.7 kg (319 lb)   SpO2 96%   BMI 45.77 kg/m²      Physical Exam   Constitutional: He is oriented to person, place, and time.   Cardiovascular: Normal rate and regular " rhythm.    Pulmonary/Chest: Effort normal.   Musculoskeletal: He exhibits no edema.   Neurological: He is alert and oriented to person, place, and time.   Skin: Skin is warm.       Assessment:     1. Abnormal stress ECG with treadmill     2. Morbid obesity with BMI of 40.0-44.9, adult (CMS-HCC)     3. Essential hypertension         Medical Decision Making:  Today's Assessment / Status / Plan:   Low risk for sinus surgery and anesthesia.  Weight loss discussed  No need for follow-up here      Jonathan Guillaume M.D.  [address] [address]  VIA Facsimile: [phone]

## 2018-05-15 ENCOUNTER — APPOINTMENT (RX ONLY)
Dept: URBAN - METROPOLITAN AREA CLINIC 36 | Facility: CLINIC | Age: 43
Setting detail: DERMATOLOGY
End: 2018-05-15

## 2018-05-15 DIAGNOSIS — L57.8 OTHER SKIN CHANGES DUE TO CHRONIC EXPOSURE TO NONIONIZING RADIATION: ICD-10-CM

## 2018-05-15 PROBLEM — C44.41 BASAL CELL CARCINOMA OF SKIN OF SCALP AND NECK: Status: ACTIVE | Noted: 2018-05-15

## 2018-05-15 PROCEDURE — ? MOHS SURGERY

## 2018-05-15 PROCEDURE — ? SCITON PROFRACTIONAL

## 2018-05-15 PROCEDURE — 17311 MOHS 1 STAGE H/N/HF/G: CPT

## 2018-05-15 PROCEDURE — 99212 OFFICE O/P EST SF 10 MIN: CPT | Mod: 25

## 2018-05-15 PROCEDURE — ? COUNSELING

## 2018-05-15 NOTE — PROCEDURE: MOHS SURGERY
Skin Substitute Units (Will Override Primary Defect Units If Greater Than 0): 0
Anesthesia Volume In Cc: 6
Interpolation Flap Text: A decision was made to reconstruct the defect utilizing an interpolation axial flap and a staged reconstruction.  A telfa template was made of the defect.  This telfa template was then used to outline the interpolation flap.  The donor area for the pedicle flap was then injected with anesthesia.  The flap was excised through the skin and subcutaneous tissue down to the layer of the underlying musculature.  The interpolation flap was carefully excised within this deep plane to maintain its blood supply.  The edges of the donor site were undermined.   The donor site was closed in a primary fashion.  The pedicle was then rotated into position and sutured.  Once the tube was sutured into place, adequate blood supply was confirmed with blanching and refill.  The pedicle was then wrapped with xeroform gauze and dressed appropriately with a telfa and gauze bandage to ensure continued blood supply and protect the attached pedicle.
Hatchet Flap Text: The defect edges were debeveled with a #15 scalpel blade.  Given the location of the defect, shape of the defect and the proximity to free margins a hatchet flap was deemed most appropriate.  Using a sterile surgical marker, an appropriate hatchet flap was drawn incorporating the defect and placing the expected incisions within the relaxed skin tension lines where possible.    The area thus outlined was incised deep to adipose tissue with a #15 scalpel blade.  The skin margins were undermined to an appropriate distance in all directions utilizing iris scissors.
Quadrant Reporting?: no
Rhombic Flap Text: The defect edges were debeveled with a #15 scalpel blade.  Given the location of the defect and the proximity to free margins a rhombic flap was deemed most appropriate.  Using a sterile surgical marker, an appropriate rhombic flap was drawn incorporating the defect.    The area thus outlined was incised deep to adipose tissue with a #15 scalpel blade.  The skin margins were undermined to an appropriate distance in all directions utilizing iris scissors.
Stage 1: Number Of Blocks?: 1
Surgeon/Pathologist Verbiage (Will Incorporate Name Of Surgeon From Intro If Not Blank): operated in two distinct and integrated capacities as the surgeon and pathologist.
Dressing (No Sutures): dry sterile dressing
Repair Type: None (only Mohs)
No Repair - Repaired With Adjacent Surgical Defect Text (Leave Blank If You Do Not Want): After obtaining clear surgical margins the defect was repaired concurrently with another surgical defect which was in close approximation.
Surgeon: Citlali Lombardo MD
Repair Performed By Another Provider Text (Leave Blank If You Do Not Want): After obtaining clear surgical margins the defect was repaired by another provider.
Muscle Hinge Flap Text: The defect edges were debeveled with a #15 scalpel blade.  Given the size, depth and location of the defect and the proximity to free margins a muscle hinge flap was deemed most appropriate.  Using a sterile surgical marker, an appropriate hinge flap was drawn incorporating the defect. The area thus outlined was incised with a #15 scalpel blade.  The skin margins were undermined to an appropriate distance in all directions utilizing iris scissors.
V-Y Flap Text: The defect edges were debeveled with a #15 scalpel blade.  Given the location of the defect, shape of the defect and the proximity to free margins a V-Y flap was deemed most appropriate.  Using a sterile surgical marker, an appropriate advancement flap was drawn incorporating the defect and placing the expected incisions within the relaxed skin tension lines where possible.    The area thus outlined was incised deep to adipose tissue with a #15 scalpel blade.  The skin margins were undermined to an appropriate distance in all directions utilizing iris scissors.
Closure 4 Information: This tab is for additional flaps and grafts above and beyond our usual structured repairs.  Please note if you enter information here it will not currently bill and you will need to add the billing information manually.
Stage 14: Additional Anesthesia Type: 1% lidocaine with epinephrine
Referred To Mid-Level For Closure Text (Leave Blank If You Do Not Want): After obtaining clear surgical margins the patient was sent to a mid-level provider for surgical repair.  The patient understands they will receive post-surgical care and follow-up from the mid-level provider.
Surgical Defect Width In Cm (Optional): 1.3
Wound Care: Aquaphor
Dorsal Nasal Flap Text: The defect edges were debeveled with a #15 scalpel blade.  Given the location of the defect and the proximity to free margins a dorsal nasal flap was deemed most appropriate.  Using a sterile surgical marker, an appropriate dorsal nasal flap was drawn around the defect.    The area thus outlined was incised deep to adipose tissue with a #15 scalpel blade.  The skin margins were undermined to an appropriate distance in all directions utilizing iris scissors.
Consent (Ear)/Introductory Paragraph: The rationale for Mohs was explained to the patient and consent was obtained. The risks, benefits and alternatives to therapy were discussed in detail. Specifically, the risks of ear deformity, infection, scarring, bleeding, prolonged wound healing, incomplete removal, allergy to anesthesia, nerve injury and recurrence were addressed. Prior to the procedure, the treatment site was clearly identified and confirmed by the patient. All components of Universal Protocol/PAUSE Rule completed.
Bcc Histology Text: There were numerous aggregates of basaloid cells.
Alar Island Pedicle Flap Text: The defect edges were debeveled with a #15 scalpel blade.  Given the location of the defect, shape of the defect and the proximity to the alar rim an island pedicle advancement flap was deemed most appropriate.  Using a sterile surgical marker, an appropriate advancement flap was drawn incorporating the defect, outlining the appropriate donor tissue and placing the expected incisions within the nasal ala running parallel to the alar rim. The area thus outlined was incised with a #15 scalpel blade.  The skin margins were undermined minimally to an appropriate distance in all directions around the primary defect and laterally outward around the island pedicle utilizing iris scissors.  There was minimal undermining beneath the pedicle flap.
Graft Donor Site Epidermal Sutures (Optional): 5-0 Surgipro
Skin Substitute Text: The defect edges were debeveled with a #15 scalpel blade.  Given the location of the defect, shape of the defect and the proximity to free margins a skin substitute graft was deemed most appropriate.  The graft material was trimmed to fit the size of the defect. The graft was then placed in the primary defect and oriented appropriately.
Suture Removal: 7 days
O-Z Plasty Text: The defect edges were debeveled with a #15 scalpel blade.  Given the location of the defect, shape of the defect and the proximity to free margins an O-Z plasty (double transposition flap) was deemed most appropriate.  Using a sterile surgical marker, the appropriate transposition flaps were drawn incorporating the defect and placing the expected incisions within the relaxed skin tension lines where possible.    The area thus outlined was incised deep to adipose tissue with a #15 scalpel blade.  The skin margins were undermined to an appropriate distance in all directions utilizing iris scissors.  Hemostasis was achieved with electrocautery.  The flaps were then transposed into place, one clockwise and the other counterclockwise, and anchored with interrupted buried subcutaneous sutures.
Modified Advancement Flap Text: The defect edges were debeveled with a #15 scalpel blade.  Given the location of the defect, shape of the defect and the proximity to free margins a modified advancement flap was deemed most appropriate.  Using a sterile surgical marker, an appropriate advancement flap was drawn incorporating the defect and placing the expected incisions within the relaxed skin tension lines where possible.    The area thus outlined was incised deep to adipose tissue with a #15 scalpel blade.  The skin margins were undermined to an appropriate distance in all directions utilizing iris scissors.
Mohs Rapid Report Verbiage: The area of clinically evident tumor was marked with skin marking ink and appropriately hatched.  The initial incision was made following the Mohs approach through the skin.  The specimen was taken to the lab, divided into the necessary number of pieces, chromacoded and processed according to the Mohs protocol.  This was repeated in successive stages until a tumor free defect was achieved.
Medical Necessity Statement: Based on my medical judgement, Mohs surgery is the most appropriate treatment for this cancer compared to other treatments.
Ear Wedge Repair Text: A wedge excision was completed by carrying down an excision through the full thickness of the ear and cartilage with an inward facing Burow's triangle. The wound was then closed in a layered fashion.
Consent (Scalp)/Introductory Paragraph: The rationale for Mohs was explained to the patient and consent was obtained. The risks, benefits and alternatives to therapy were discussed in detail. Specifically, the risks of changes in hair growth pattern secondary to repair, infection, scarring, bleeding, prolonged wound healing, incomplete removal, allergy to anesthesia, nerve injury and recurrence were addressed. Prior to the procedure, the treatment site was clearly identified and confirmed by the patient. All components of Universal Protocol/PAUSE Rule completed.
Lazy S Intermediate Repair Preamble Text (Leave Blank If You Do Not Want): Undermining was performed with blunt dissection.
Advancement Flap (Double) Text: The defect edges were debeveled with a #15 scalpel blade.  Given the location of the defect and the proximity to free margins a double advancement flap was deemed most appropriate.  Using a sterile surgical marker, the appropriate advancement flaps were drawn incorporating the defect and placing the expected incisions within the relaxed skin tension lines where possible.    The area thus outlined was incised deep to adipose tissue with a #15 scalpel blade.  The skin margins were undermined to an appropriate distance in all directions utilizing iris scissors.
Referred To Plastics For Closure Text (Leave Blank If You Do Not Want): After obtaining clear surgical margins the patient was sent to plastics for surgical repair.  The patient understands they will receive post-surgical care and follow-up from the referring physician's office.
Epidermal Closure Graft Donor Site (Optional): running
Home Suture Removal Text: Patient was provided instructions on removing sutures and will remove their sutures at home.  If they have any questions or difficulties they will call the office.
Graft Donor Site Dermal Sutures (Optional): 5-0 Polysorb
Consent 3/Introductory Paragraph: I gave the patient a chance to ask questions they had about the procedure.  Following this I explained the Mohs procedure and consent was obtained. The risks, benefits and alternatives to therapy were discussed in detail. Specifically, the risks of infection, scarring, bleeding, prolonged wound healing, incomplete removal, allergy to anesthesia, nerve injury and recurrence were addressed. Prior to the procedure, the treatment site was clearly identified and confirmed by the patient. All components of Universal Protocol/PAUSE Rule completed.
Detail Level: Detailed
H Plasty Text: Given the location of the defect, shape of the defect and the proximity to free margins a H-plasty was deemed most appropriate for repair.  Using a sterile surgical marker, the appropriate advancement arms of the H-plasty were drawn incorporating the defect and placing the expected incisions within the relaxed skin tension lines where possible. The area thus outlined was incised deep to adipose tissue with a #15 scalpel blade. The skin margins were undermined to an appropriate distance in all directions utilizing iris scissors.  The opposing advancement arms were then advanced into place in opposite direction and anchored with interrupted buried subcutaneous sutures.
Split-Thickness Skin Graft Text: The defect edges were debeveled with a #15 scalpel blade.  Given the location of the defect, shape of the defect and the proximity to free margins a split thickness skin graft was deemed most appropriate.  Using a sterile surgical marker, the primary defect shape was transferred to the donor site. The split thickness graft was then harvested.  The skin graft was then placed in the primary defect and oriented appropriately.
Xenograft Text: The defect edges were debeveled with a #15 scalpel blade.  Given the location of the defect, shape of the defect and the proximity to free margins a xenograft was deemed most appropriate.  The graft was then trimmed to fit the size of the defect.  The graft was then placed in the primary defect and oriented appropriately.
Show Asc Variables: Yes
Bilobed Flap Text: The defect edges were debeveled with a #15 scalpel blade.  Given the location of the defect and the proximity to free margins a bilobe flap was deemed most appropriate.  Using a sterile surgical marker, an appropriate bilobe flap drawn around the defect.    The area thus outlined was incised deep to adipose tissue with a #15 scalpel blade.  The skin margins were undermined to an appropriate distance in all directions utilizing iris scissors.
Eye Protection Verbiage: Before proceeding with the stage, a plastic scleral shield was inserted. The globe was anesthetized with a few drops of 1% lidocaine with 1:100,000 epinephrine. Then, an appropriate sized scleral shield was chosen and coated with lacrilube ointment. The shield was gently inserted and left in place for the duration of each stage. After the stage was completed, the shield was gently removed.
Partial Purse String (Intermediate) Text: Given the location of the defect and the characteristics of the surrounding skin an intermediate purse string closure was deemed most appropriate.  Undermining was performed circumfirentially around the surgical defect.  A purse string suture was then placed and tightened. Wound tension only allowed a partial closure of the circular defect.
Area L Indication Text: Tumors in this location are included in Area L (trunk and extremities).  Mohs surgery is indicated for larger tumors, or tumors with aggressive histologic features, in these anatomic locations.
Dressing: pressure dressing with telfa
Z Plasty Text: The lesion was extirpated to the level of the fat with a #15 scalpel blade.  Given the location of the defect, shape of the defect and the proximity to free margins a Z-plasty was deemed most appropriate for repair.  Using a sterile surgical marker, the appropriate transposition arms of the Z-plasty were drawn incorporating the defect and placing the expected incisions within the relaxed skin tension lines where possible.    The area thus outlined was incised deep to adipose tissue with a #15 scalpel blade.  The skin margins were undermined to an appropriate distance in all directions utilizing iris scissors.  The opposing transposition arms were then transposed into place in opposite direction and anchored with interrupted buried subcutaneous sutures.
O-T Advancement Flap Text: The defect edges were debeveled with a #15 scalpel blade.  Given the location of the defect, shape of the defect and the proximity to free margins an O-T advancement flap was deemed most appropriate.  Using a sterile surgical marker, an appropriate advancement flap was drawn incorporating the defect and placing the expected incisions within the relaxed skin tension lines where possible.    The area thus outlined was incised deep to adipose tissue with a #15 scalpel blade.  The skin margins were undermined to an appropriate distance in all directions utilizing iris scissors.
Tarsorrhaphy Text: A tarsorrhaphy was performed using Frost sutures.
Donor Site Anesthesia Type: same as repair anesthesia
Referred To Oculoplastics For Closure Text (Leave Blank If You Do Not Want): After obtaining clear surgical margins the patient was sent to oculoplastics for surgical repair.  The patient understands they will receive post-surgical care and follow-up from the referring physician's office.
Postop Diagnosis: same
No Residual Tumor Seen Histology Text: There were no malignant cells seen in the sections examined.
Bcc Infiltrative Histology Text: There were numerous aggregates of basaloid cells demonstrating an infiltrative pattern.
Cheiloplasty (Less Than 50%) Text: A decision was made to reconstruct the defect with a  cheiloplasty.  The defect was undermined extensively.  Additional obicularis oris muscle was excised with a 15 blade scalpel.  The defect was converted into a full thickness wedge, of less than 50% of the vertical height of the lip, to facilite a better cosmetic result.  Small vessels were then tied off with 5-0 monocyrl. The obicularis oris, superficial fascia, adipose and dermis were then reapproximated.  After the deeper layers were approximated the epidermis was reapproximated with particular care given to realign the vermilion border.
Ftsg Text: The defect edges were debeveled with a #15 scalpel blade.  Given the location of the defect, shape of the defect and the proximity to free margins a full thickness skin graft was deemed most appropriate.  Using a sterile surgical marker, the primary defect shape was transferred to the donor site. The area thus outlined was incised deep to adipose tissue with a #15 scalpel blade.  The harvested graft was then trimmed of adipose tissue until only dermis and epidermis was left.  The skin margins of the secondary defect were undermined to an appropriate distance in all directions utilizing iris scissors.  The secondary defect was closed with interrupted buried subcutaneous sutures.  The skin edges were then re-apposed with running  sutures.  The skin graft was then placed in the primary defect and oriented appropriately.
Area H Indication Text: Tumors in this location are included in Area H (eyelids, eyebrows, nose, lips, chin, ear, pre-auricular, post-auricular, temple, genitalia, hands, feet, ankles and areola).  Tissue conservation is critical in these anatomic locations.
Wound Care (No Sutures): Petrolatum
Previous Accession (Optional): [id number]
Spiral Flap Text: The defect edges were debeveled with a #15 scalpel blade.  Given the location of the defect, shape of the defect and the proximity to free margins a spiral flap was deemed most appropriate.  Using a sterile surgical marker, an appropriate rotation flap was drawn incorporating the defect and placing the expected incisions within the relaxed skin tension lines where possible. The area thus outlined was incised deep to adipose tissue with a #15 scalpel blade.  The skin margins were undermined to an appropriate distance in all directions utilizing iris scissors.
Mastoid Interpolation Flap Text: A decision was made to reconstruct the defect utilizing an interpolation axial flap and a staged reconstruction.  A telfa template was made of the defect.  This telfa template was then used to outline the mastoid interpolation flap.  The donor area for the pedicle flap was then injected with anesthesia.  The flap was excised through the skin and subcutaneous tissue down to the layer of the underlying musculature.  The pedicle flap was carefully excised within this deep plane to maintain its blood supply.  The edges of the donor site were undermined.   The donor site was closed in a primary fashion.  The pedicle was then rotated into position and sutured.  Once the tube was sutured into place, adequate blood supply was confirmed with blanching and refill.  The pedicle was then wrapped with xeroform gauze and dressed appropriately with a telfa and gauze bandage to ensure continued blood supply and protect the attached pedicle.
Double Island Pedicle Flap Text: The defect edges were debeveled with a #15 scalpel blade.  Given the location of the defect, shape of the defect and the proximity to free margins a double island pedicle advancement flap was deemed most appropriate.  Using a sterile surgical marker, an appropriate advancement flap was drawn incorporating the defect, outlining the appropriate donor tissue and placing the expected incisions within the relaxed skin tension lines where possible.    The area thus outlined was incised deep to adipose tissue with a #15 scalpel blade.  The skin margins were undermined to an appropriate distance in all directions around the primary defect and laterally outward around the island pedicle utilizing iris scissors.  There was minimal undermining beneath the pedicle flap.
Dermal Autograft Text: The defect edges were debeveled with a #15 scalpel blade.  Given the location of the defect, shape of the defect and the proximity to free margins a dermal autograft was deemed most appropriate.  Using a sterile surgical marker, the primary defect shape was transferred to the donor site. The area thus outlined was incised deep to adipose tissue with a #15 scalpel blade.  The harvested graft was then trimmed of adipose and epidermal tissue until only dermis was left.  The skin graft was then placed in the primary defect and oriented appropriately.
Consent (Lip)/Introductory Paragraph: The rationale for Mohs was explained to the patient and consent was obtained. The risks, benefits and alternatives to therapy were discussed in detail. Specifically, the risks of lip deformity, changes in the oral aperture, infection, scarring, bleeding, prolonged wound healing, incomplete removal, allergy to anesthesia, nerve injury and recurrence were addressed. Prior to the procedure, the treatment site was clearly identified and confirmed by the patient. All components of Universal Protocol/PAUSE Rule completed.
Bilateral Helical Rim Advancement Flap Text: The defect edges were debeveled with a #15 blade scalpel.  Given the location of the defect and the proximity to free margins (helical rim) a bilateral helical rim advancement flap was deemed most appropriate.  Using a sterile surgical marker, the appropriate advancement flaps were drawn incorporating the defect and placing the expected incisions between the helical rim and antihelix where possible.  The area thus outlined was incised through and through with a #15 scalpel blade.  With a skin hook and iris scissors, the flaps were gently and sharply undermined and freed up.
Composite Graft Text: The defect edges were debeveled with a #15 scalpel blade.  Given the location of the defect, shape of the defect, the proximity to free margins and the fact the defect was full thickness a composite graft was deemed most appropriate.  The defect was outline and then transferred to the donor site.  A full thickness graft was then excised from the donor site. The graft was then placed in the primary defect, oriented appropriately and then sutured into place.  The secondary defect was then repaired using a primary closure.
Consent 1/Introductory Paragraph: The rationale for Mohs was explained to the patient and consent was obtained. The risks, benefits and alternatives to therapy were discussed in detail. Specifically, the risks of infection, scarring, bleeding, prolonged wound healing, incomplete removal, allergy to anesthesia, nerve injury and recurrence were addressed. Prior to the procedure, the treatment site was clearly identified and confirmed by the patient. All components of Universal Protocol/PAUSE Rule completed.
Keystone Flap Text: The defect edges were debeveled with a #15 scalpel blade.  Given the location of the defect, shape of the defect a keystone flap was deemed most appropriate.  Using a sterile surgical marker, an appropriate keystone flap was drawn incorporating the defect, outlining the appropriate donor tissue and placing the expected incisions within the relaxed skin tension lines where possible. The area thus outlined was incised deep to adipose tissue with a #15 scalpel blade.  The skin margins were undermined to an appropriate distance in all directions around the primary defect and laterally outward around the flap utilizing iris scissors.
Consent (Marginal Mandibular)/Introductory Paragraph: The rationale for Mohs was explained to the patient and consent was obtained. The risks, benefits and alternatives to therapy were discussed in detail. Specifically, the risks of damage to the marginal mandibular branch of the facial nerve, infection, scarring, bleeding, prolonged wound healing, incomplete removal, allergy to anesthesia, and recurrence were addressed. Prior to the procedure, the treatment site was clearly identified and confirmed by the patient. All components of Universal Protocol/PAUSE Rule completed.
Crescentic Advancement Flap Text: The defect edges were debeveled with a #15 scalpel blade.  Given the location of the defect and the proximity to free margins a crescentic advancement flap was deemed most appropriate.  Using a sterile surgical marker, the appropriate advancement flap was drawn incorporating the defect and placing the expected incisions within the relaxed skin tension lines where possible.    The area thus outlined was incised deep to adipose tissue with a #15 scalpel blade.  The skin margins were undermined to an appropriate distance in all directions utilizing iris scissors.
Purse String (Intermediate) Text: Given the location of the defect and the characteristics of the surrounding skin a purse string intermediate closure was deemed most appropriate.  Undermining was performed circumfirentially around the surgical defect.  A purse string suture was then placed and tightened.
O-T Plasty Text: The defect edges were debeveled with a #15 scalpel blade.  Given the location of the defect, shape of the defect and the proximity to free margins an O-T plasty was deemed most appropriate.  Using a sterile surgical marker, an appropriate O-T plasty was drawn incorporating the defect and placing the expected incisions within the relaxed skin tension lines where possible.    The area thus outlined was incised deep to adipose tissue with a #15 scalpel blade.  The skin margins were undermined to an appropriate distance in all directions utilizing iris scissors.
Bilobed Transposition Flap Text: The defect edges were debeveled with a #15 scalpel blade.  Given the location of the defect and the proximity to free margins a bilobed transposition flap was deemed most appropriate.  Using a sterile surgical marker, an appropriate bilobe flap drawn around the defect.    The area thus outlined was incised deep to adipose tissue with a #15 scalpel blade.  The skin margins were undermined to an appropriate distance in all directions utilizing iris scissors.
Full Thickness Lip Wedge Repair (Flap) Text: Given the location of the defect and the proximity to free margins a full thickness wedge repair was deemed most appropriate.  Using a sterile surgical marker, the appropriate repair was drawn incorporating the defect and placing the expected incisions perpendicular to the vermilion border.  The vermilion border was also meticulously outlined to ensure appropriate reapproximation during the repair.  The area thus outlined was incised through and through with a #15 scalpel blade.  The muscularis and dermis were reaproximated with deep sutures following hemostasis. Care was taken to realign the vermilion border before proceeding with the superficial closure.  Once the vermilion was realigned the superfical and mucosal closure was finished.
Partial Purse String (Simple) Text: Given the location of the defect and the characteristics of the surrounding skin a simple purse string closure was deemed most appropriate.  Undermining was performed circumfirentially around the surgical defect.  A purse string suture was then placed and tightened. Wound tension only allowed a partial closure of the circular defect.
Lazy S Complex Repair Preamble Text (Leave Blank If You Do Not Want): Extensive wide undermining was performed.
Cartilage Graft Text: The defect edges were debeveled with a #15 scalpel blade.  Given the location of the defect, shape of the defect, the fact the defect involved a full thickness cartilage defect a cartilage graft was deemed most appropriate.  An appropriate donor site was identified, cleansed, and anesthetized. The cartilage graft was then harvested and transferred to the recipient site, oriented appropriately and then sutured into place.  The secondary defect was then repaired using a primary closure.
Mauc Instructions: By selecting yes to the question below the MAUC number will be added into the note.  This will be calculated automatically based on the diagnosis chosen, the size entered, the body zone selected (H,M,L) and the specific indications you chose. You will also have the option to override the Mohs AUC if you disagree with the automatically calculated number and this option is found in the Case Summary tab.
Consent 2/Introductory Paragraph: Mohs surgery was explained to the patient and consent was obtained. The risks, benefits and alternatives to therapy were discussed in detail. Specifically, the risks of infection, scarring, bleeding, prolonged wound healing, incomplete removal, allergy to anesthesia, nerve injury and recurrence were addressed. Prior to the procedure, the treatment site was clearly identified and confirmed by the patient. All components of Universal Protocol/PAUSE Rule completed.
Post-Care Instructions: I reviewed with the patient in detail post-care instructions. Patient is not to engage in any heavy lifting, exercise, or swimming for the next 14 days. Should the patient develop any fevers, chills, bleeding, severe pain patient will contact the office immediately.
Melolabial Interpolation Flap Text: A decision was made to reconstruct the defect utilizing an interpolation axial flap and a staged reconstruction.  A telfa template was made of the defect.  This telfa template was then used to outline the melolabial interpolation flap.  The donor area for the pedicle flap was then injected with anesthesia.  The flap was excised through the skin and subcutaneous tissue down to the layer of the underlying musculature.  The pedicle flap was carefully excised within this deep plane to maintain its blood supply.  The edges of the donor site were undermined.   The donor site was closed in a primary fashion.  The pedicle was then rotated into position and sutured.  Once the tube was sutured into place, adequate blood supply was confirmed with blanching and refill.  The pedicle was then wrapped with xeroform gauze and dressed appropriately with a telfa and gauze bandage to ensure continued blood supply and protect the attached pedicle.
Cheek-To-Nose Interpolation Flap Text: A decision was made to reconstruct the defect utilizing an interpolation axial flap and a staged reconstruction.  A telfa template was made of the defect.  This telfa template was then used to outline the Cheek-To-Nose Interpolation flap.  The donor area for the pedicle flap was then injected with anesthesia.  The flap was excised through the skin and subcutaneous tissue down to the layer of the underlying musculature.  The interpolation flap was carefully excised within this deep plane to maintain its blood supply.  The edges of the donor site were undermined.   The donor site was closed in a primary fashion.  The pedicle was then rotated into position and sutured.  Once the tube was sutured into place, adequate blood supply was confirmed with blanching and refill.  The pedicle was then wrapped with xeroform gauze and dressed appropriately with a telfa and gauze bandage to ensure continued blood supply and protect the attached pedicle.
Mohs Histo Method Verbiage: Each section was then chromacoded and processed in the Mohs lab using the Mohs protocol and submitted for frozen section.
Mohs Case Number: [id number]
Manual Repair Warning Statement: We plan on removing the manually selected variable below in favor of our much easier automatic structured text blocks found in the previous tab. We decided to do this to help make the flow better and give you the full power of structured data. Manual selection is never going to be ideal in our platform and I would encourage you to avoid using manual selection from this point on, especially since I will be sunsetting this feature. It is important that you do one of two things with the customized text below. First, you can save all of the text in a word file so you can have it for future reference. Second, transfer the text to the appropriate area in the Library tab. Lastly, if there is a flap or graft type which we do not have you need to let us know right away so I can add it in before the variable is hidden. No need to panic, we plan to give you roughly 6 months to make the change.
Unna Boot Text: An Unna boot was placed to help immobilize the limb and facilitate more rapid healing.
Trilobed Flap Text: The defect edges were debeveled with a #15 scalpel blade.  Given the location of the defect and the proximity to free margins a trilobed flap was deemed most appropriate.  Using a sterile surgical marker, an appropriate trilobed flap drawn around the defect.    The area thus outlined was incised deep to adipose tissue with a #15 scalpel blade.  The skin margins were undermined to an appropriate distance in all directions utilizing iris scissors.
Secondary Intention Text (Leave Blank If You Do Not Want): The defect will heal with secondary intention.
Mohs Method Verbiage: An incision at a 45 degree angle following the standard Mohs approach was done and the specimen was harvested as a microscopic controlled layer.
Referring Physician (Optional): Mamadou PATTON
Alternatives Discussed Intro (Do Not Add Period): I discussed alternative treatments to Mohs surgery and specifically discussed the risks and benefits of
Hemostasis: Electrocautery
Advancement Flap (Single) Text: The defect edges were debeveled with a #15 scalpel blade.  Given the location of the defect and the proximity to free margins a single advancement flap was deemed most appropriate.  Using a sterile surgical marker, an appropriate advancement flap was drawn incorporating the defect and placing the expected incisions within the relaxed skin tension lines where possible.    The area thus outlined was incised deep to adipose tissue with a #15 scalpel blade.  The skin margins were undermined to an appropriate distance in all directions utilizing iris scissors.
S Plasty Text: Given the location and shape of the defect, and the orientation of relaxed skin tension lines, an S-plasty was deemed most appropriate for repair.  Using a sterile surgical marker, the appropriate outline of the S-plasty was drawn, incorporating the defect and placing the expected incisions within the relaxed skin tension lines where possible.  The area thus outlined was incised deep to adipose tissue with a #15 scalpel blade.  The skin margins were undermined to an appropriate distance in all directions utilizing iris scissors. The skin flaps were advanced over the defect.  The opposing margins were then approximated with interrupted buried subcutaneous sutures.
Advancement-Rotation Flap Text: The defect edges were debeveled with a #15 scalpel blade.  Given the location of the defect, shape of the defect and the proximity to free margins an advancement-rotation flap was deemed most appropriate.  Using a sterile surgical marker, an appropriate flap was drawn incorporating the defect and placing the expected incisions within the relaxed skin tension lines where possible. The area thus outlined was incised deep to adipose tissue with a #15 scalpel blade.  The skin margins were undermined to an appropriate distance in all directions utilizing iris scissors.
Consent (Near Eyelid Margin)/Introductory Paragraph: The rationale for Mohs was explained to the patient and consent was obtained. The risks, benefits and alternatives to therapy were discussed in detail. Specifically, the risks of ectropion or eyelid deformity, infection, scarring, bleeding, prolonged wound healing, incomplete removal, allergy to anesthesia, nerve injury and recurrence were addressed. Prior to the procedure, the treatment site was clearly identified and confirmed by the patient. All components of Universal Protocol/PAUSE Rule completed.
Tissue Cultured Epidermal Autograft Text: The defect edges were debeveled with a #15 scalpel blade.  Given the location of the defect, shape of the defect and the proximity to free margins a tissue cultured epidermal autograft was deemed most appropriate.  The graft was then trimmed to fit the size of the defect.  The graft was then placed in the primary defect and oriented appropriately.
Island Pedicle Flap With Canthal Suspension Text: The defect edges were debeveled with a #15 scalpel blade.  Given the location of the defect, shape of the defect and the proximity to free margins an island pedicle advancement flap was deemed most appropriate.  Using a sterile surgical marker, an appropriate advancement flap was drawn incorporating the defect, outlining the appropriate donor tissue and placing the expected incisions within the relaxed skin tension lines where possible. The area thus outlined was incised deep to adipose tissue with a #15 scalpel blade.  The skin margins were undermined to an appropriate distance in all directions around the primary defect and laterally outward around the island pedicle utilizing iris scissors.  There was minimal undermining beneath the pedicle flap. A suspension suture was placed in the canthal tendon to prevent tension and prevent ectropion.
Estimated Blood Loss (Cc): minimal
Bi-Rhombic Flap Text: The defect edges were debeveled with a #15 scalpel blade.  Given the location of the defect and the proximity to free margins a bi-rhombic flap was deemed most appropriate.  Using a sterile surgical marker, an appropriate rhombic flap was drawn incorporating the defect. The area thus outlined was incised deep to adipose tissue with a #15 scalpel blade.  The skin margins were undermined to an appropriate distance in all directions utilizing iris scissors.
Transposition Flap Text: The defect edges were debeveled with a #15 scalpel blade.  Given the location of the defect and the proximity to free margins a transposition flap was deemed most appropriate.  Using a sterile surgical marker, an appropriate transposition flap was drawn incorporating the defect.    The area thus outlined was incised deep to adipose tissue with a #15 scalpel blade.  The skin margins were undermined to an appropriate distance in all directions utilizing iris scissors.
Localized Dermabrasion With Wire Brush Text: The patient was draped in routine manner.  Localized dermabrasion using 3 x 17 mm wire brush was performed in routine manner to papillary dermis. This spot dermabrasion is being performed to complete skin cancer reconstruction. It also will eliminate the other sun damaged precancerous cells that are known to be part of the regional effect of a lifetime's worth of sun exposure. This localized dermabrasion is therapeutic and should not be considered cosmetic in any regard.
Referred To Asc For Closure Text (Leave Blank If You Do Not Want): After obtaining clear surgical margins the patient was sent to an ASC for surgical repair.  The patient understands they will receive post-surgical care and follow-up from the ASC physician.
Subsequent Stages Histo Method Verbiage: Using a similar technique to that described above, a thin layer of tissue was removed from all areas where tumor was visible on the previous stage.  The tissue was again oriented, mapped, dyed, and processed as above.
Mucosal Advancement Flap Text: Given the location of the defect, shape of the defect and the proximity to free margins a mucosal advancement flap was deemed most appropriate. Incisions were made with a 15 blade scalpel in the appropriate fashion along the cutaneous vermilion border and the mucosal lip. The remaining actinically damaged mucosal tissue was excised.  The mucosal advancement flap was then elevated to the gingival sulcus with care taken to preserve the neurovascular structures and advanced into the primary defect. Care was taken to ensure that precise realignment of the vermilion border was achieved.
Complex Repair And Graft Additional Text (Will Appearing After The Standard Complex Repair Text): The complex repair was not sufficient to completely close the primary defect. The remaining additional defect was repaired with the graft mentioned below.
Island Pedicle Flap Text: The defect edges were debeveled with a #15 scalpel blade.  Given the location of the defect, shape of the defect and the proximity to free margins an island pedicle advancement flap was deemed most appropriate.  Using a sterile surgical marker, an appropriate advancement flap was drawn incorporating the defect, outlining the appropriate donor tissue and placing the expected incisions within the relaxed skin tension lines where possible.    The area thus outlined was incised deep to adipose tissue with a #15 scalpel blade.  The skin margins were undermined to an appropriate distance in all directions around the primary defect and laterally outward around the island pedicle utilizing iris scissors.  There was minimal undermining beneath the pedicle flap.
Closure 2 Information: This tab is for additional flaps and grafts, including complex repair and grafts and complex repair and flaps. You can also specify a different location for the additional defect, if the location is the same you do not need to select a new one. We will insert the automated text for the repair you select below just as we do for solitary flaps and grafts. Please note that at this time if you select a location with a different insurance zone you will need to override the ICD10 and CPT if appropriate.
Cheiloplasty (Complex) Text: A decision was made to reconstruct the defect with a  cheiloplasty.  The defect was undermined extensively.  Additional obicularis oris muscle was excised with a 15 blade scalpel.  The defect was converted into a full thickness wedge to facilite a better cosmetic result.  Small vessels were then tied off with 5-0 monocyrl. The obicularis oris, superficial fascia, adipose and dermis were then reapproximated.  After the deeper layers were approximated the epidermis was reapproximated with particular care given to realign the vermilion border.
Helical Rim Advancement Flap Text: The defect edges were debeveled with a #15 blade scalpel.  Given the location of the defect and the proximity to free margins (helical rim) a double helical rim advancement flap was deemed most appropriate.  Using a sterile surgical marker, the appropriate advancement flaps were drawn incorporating the defect and placing the expected incisions between the helical rim and antihelix where possible.  The area thus outlined was incised through and through with a #15 scalpel blade.  With a skin hook and iris scissors, the flaps were gently and sharply undermined and freed up.
Wound Check: 6 weeks
Paramedian Forehead Flap Text: A decision was made to reconstruct the defect utilizing an interpolation axial flap and a staged reconstruction.  A telfa template was made of the defect.  This telfa template was then used to outline the paramedian forehead pedicle flap.  The donor area for the pedicle flap was then injected with anesthesia.  The flap was excised through the skin and subcutaneous tissue down to the layer of the underlying musculature.  The pedicle flap was carefully excised within this deep plane to maintain its blood supply.  The edges of the donor site were undermined.   The donor site was closed in a primary fashion.  The pedicle was then rotated into position and sutured.  Once the tube was sutured into place, adequate blood supply was confirmed with blanching and refill.  The pedicle was then wrapped with xeroform gauze and dressed appropriately with a telfa and gauze bandage to ensure continued blood supply and protect the attached pedicle.
Complex Repair And Flap Additional Text (Will Appearing After The Standard Complex Repair Text): The complex repair was not sufficient to completely close the primary defect. The remaining additional defect was repaired with the flap mentioned below.
Burow's Advancement Flap Text: The defect edges were debeveled with a #15 scalpel blade.  Given the location of the defect and the proximity to free margins a Burow's advancement flap was deemed most appropriate.  Using a sterile surgical marker, the appropriate advancement flap was drawn incorporating the defect and placing the expected incisions within the relaxed skin tension lines where possible.    The area thus outlined was incised deep to adipose tissue with a #15 scalpel blade.  The skin margins were undermined to an appropriate distance in all directions utilizing iris scissors.
Area M Indication Text: Tumors in this location are included in Area M (cheek, forehead, scalp, neck, jawline and pretibial skin).  Mohs surgery is indicated for tumors in these anatomic locations.
Inflammation Suggestive Of Cancer Camouflage Histology Text: There was a dense lymphocytic infiltrate which prevented adequate histologic evaluation of adjacent structures.
W Plasty Text: The lesion was extirpated to the level of the fat with a #15 scalpel blade.  Given the location of the defect, shape of the defect and the proximity to free margins a W-plasty was deemed most appropriate for repair.  Using a sterile surgical marker, the appropriate transposition arms of the W-plasty were drawn incorporating the defect and placing the expected incisions within the relaxed skin tension lines where possible.    The area thus outlined was incised deep to adipose tissue with a #15 scalpel blade.  The skin margins were undermined to an appropriate distance in all directions utilizing iris scissors.  The opposing transposition arms were then transposed into place in opposite direction and anchored with interrupted buried subcutaneous sutures.
A-T Advancement Flap Text: The defect edges were debeveled with a #15 scalpel blade.  Given the location of the defect, shape of the defect and the proximity to free margins an A-T advancement flap was deemed most appropriate.  Using a sterile surgical marker, an appropriate advancement flap was drawn incorporating the defect and placing the expected incisions within the relaxed skin tension lines where possible.    The area thus outlined was incised deep to adipose tissue with a #15 scalpel blade.  The skin margins were undermined to an appropriate distance in all directions utilizing iris scissors.
Posterior Auricular Interpolation Flap Text: A decision was made to reconstruct the defect utilizing an interpolation axial flap and a staged reconstruction.  A telfa template was made of the defect.  This telfa template was then used to outline the posterior auricular interpolation flap.  The donor area for the pedicle flap was then injected with anesthesia.  The flap was excised through the skin and subcutaneous tissue down to the layer of the underlying musculature.  The pedicle flap was carefully excised within this deep plane to maintain its blood supply.  The edges of the donor site were undermined.   The donor site was closed in a primary fashion.  The pedicle was then rotated into position and sutured.  Once the tube was sutured into place, adequate blood supply was confirmed with blanching and refill.  The pedicle was then wrapped with xeroform gauze and dressed appropriately with a telfa and gauze bandage to ensure continued blood supply and protect the attached pedicle.
Purse String (Simple) Text: Given the location of the defect and the characteristics of the surrounding skin a purse string closure was deemed most appropriate.  Undermining was performed circumfirentially around the surgical defect.  A purse string suture was then placed and tightened.
Consent (Spinal Accessory)/Introductory Paragraph: The rationale for Mohs was explained to the patient and consent was obtained. The risks, benefits and alternatives to therapy were discussed in detail. Specifically, the risks of damage to the spinal accessory nerve, infection, scarring, bleeding, prolonged wound healing, incomplete removal, allergy to anesthesia, and recurrence were addressed. Prior to the procedure, the treatment site was clearly identified and confirmed by the patient. All components of Universal Protocol/PAUSE Rule completed.
Referred To Otolaryngology For Closure Text (Leave Blank If You Do Not Want): After obtaining clear surgical margins the patient was sent to otolaryngology for surgical repair.  The patient understands they will receive post-surgical care and follow-up from the referring physician's office.
Consent (Temporal Branch)/Introductory Paragraph: The rationale for Mohs was explained to the patient and consent was obtained. The risks, benefits and alternatives to therapy were discussed in detail. Specifically, the risks of damage to the temporal branch of the facial nerve, infection, scarring, bleeding, prolonged wound healing, incomplete removal, allergy to anesthesia, and recurrence were addressed. Prior to the procedure, the treatment site was clearly identified and confirmed by the patient. All components of Universal Protocol/PAUSE Rule completed.
Ear Star Wedge Flap Text: The defect edges were debeveled with a #15 blade scalpel.  Given the location of the defect and the proximity to free margins (helical rim) an ear star wedge flap was deemed most appropriate.  Using a sterile surgical marker, the appropriate flap was drawn incorporating the defect and placing the expected incisions between the helical rim and antihelix where possible.  The area thus outlined was incised through and through with a #15 scalpel blade.
Melolabial Transposition Flap Text: The defect edges were debeveled with a #15 scalpel blade.  Given the location of the defect and the proximity to free margins a melolabial flap was deemed most appropriate.  Using a sterile surgical marker, an appropriate melolabial transposition flap was drawn incorporating the defect.    The area thus outlined was incised deep to adipose tissue with a #15 scalpel blade.  The skin margins were undermined to an appropriate distance in all directions utilizing iris scissors.
Consent (Nose)/Introductory Paragraph: The rationale for Mohs was explained to the patient and consent was obtained. The risks, benefits and alternatives to therapy were discussed in detail. Specifically, the risks of nasal deformity, changes in the flow of air through the nose, infection, scarring, bleeding, prolonged wound healing, incomplete removal, allergy to anesthesia, nerve injury and recurrence were addressed. Prior to the procedure, the treatment site was clearly identified and confirmed by the patient. All components of Universal Protocol/PAUSE Rule completed.
Epidermal Autograft Text: The defect edges were debeveled with a #15 scalpel blade.  Given the location of the defect, shape of the defect and the proximity to free margins an epidermal autograft was deemed most appropriate.  Using a sterile surgical marker, the primary defect shape was transferred to the donor site. The epidermal graft was then harvested.  The skin graft was then placed in the primary defect and oriented appropriately.
Graft Donor Site Bandage (Optional-Leave Blank If You Don't Want In Note): Aquaplast was fitted to the graft site and sewn into place. A pressure bandage were applied to the donor site and over the aquaplast bolster.
Deep Sutures: 4-0 Polysorb
Island Pedicle Flap-Requiring Vessel Identification Text: The defect edges were debeveled with a #15 scalpel blade.  Given the location of the defect, shape of the defect and the proximity to free margins an island pedicle advancement flap was deemed most appropriate.  Using a sterile surgical marker, an appropriate advancement flap was drawn, based on the axial vessel mentioned above, incorporating the defect, outlining the appropriate donor tissue and placing the expected incisions within the relaxed skin tension lines where possible.    The area thus outlined was incised deep to adipose tissue with a #15 scalpel blade.  The skin margins were undermined to an appropriate distance in all directions around the primary defect and laterally outward around the island pedicle utilizing iris scissors.  There was minimal undermining beneath the pedicle flap.
O-L Flap Text: The defect edges were debeveled with a #15 scalpel blade.  Given the location of the defect, shape of the defect and the proximity to free margins an O-L flap was deemed most appropriate.  Using a sterile surgical marker, an appropriate advancement flap was drawn incorporating the defect and placing the expected incisions within the relaxed skin tension lines where possible.    The area thus outlined was incised deep to adipose tissue with a #15 scalpel blade.  The skin margins were undermined to an appropriate distance in all directions utilizing iris scissors.
Consent Type: Consent 1 (Standard)
Star Wedge Flap Text: The defect edges were debeveled with a #15 scalpel blade.  Given the location of the defect, shape of the defect and the proximity to free margins a star wedge flap was deemed most appropriate.  Using a sterile surgical marker, an appropriate rotation flap was drawn incorporating the defect and placing the expected incisions within the relaxed skin tension lines where possible. The area thus outlined was incised deep to adipose tissue with a #15 scalpel blade.  The skin margins were undermined to an appropriate distance in all directions utilizing iris scissors.
Same Histology In Subsequent Stages Text: The pattern and morphology of the tumor is as described in the first stage.
Epidermal Closure: running cuticular
Rotation Flap Text: The defect edges were debeveled with a #15 scalpel blade.  Given the location of the defect, shape of the defect and the proximity to free margins a rotation flap was deemed most appropriate.  Using a sterile surgical marker, an appropriate rotation flap was drawn incorporating the defect and placing the expected incisions within the relaxed skin tension lines where possible.    The area thus outlined was incised deep to adipose tissue with a #15 scalpel blade.  The skin margins were undermined to an appropriate distance in all directions utilizing iris scissors.
V-Y Plasty Text: The defect edges were debeveled with a #15 scalpel blade.  Given the location of the defect, shape of the defect and the proximity to free margins an V-Y advancement flap was deemed most appropriate.  Using a sterile surgical marker, an appropriate advancement flap was drawn incorporating the defect and placing the expected incisions within the relaxed skin tension lines where possible.    The area thus outlined was incised deep to adipose tissue with a #15 scalpel blade.  The skin margins were undermined to an appropriate distance in all directions utilizing iris scissors.
Cheek Interpolation Flap Text: A decision was made to reconstruct the defect utilizing an interpolation axial flap and a staged reconstruction.  A telfa template was made of the defect.  This telfa template was then used to outline the Cheek Interpolation flap.  The donor area for the pedicle flap was then injected with anesthesia.  The flap was excised through the skin and subcutaneous tissue down to the layer of the underlying musculature.  The interpolation flap was carefully excised within this deep plane to maintain its blood supply.  The edges of the donor site were undermined.   The donor site was closed in a primary fashion.  The pedicle was then rotated into position and sutured.  Once the tube was sutured into place, adequate blood supply was confirmed with blanching and refill.  The pedicle was then wrapped with xeroform gauze and dressed appropriately with a telfa and gauze bandage to ensure continued blood supply and protect the attached pedicle.

## 2018-05-15 NOTE — PROCEDURE: SCITON PROFRACTIONAL
Detail Level: Zone
Ablation Depth (Optional): 160
Price (Use Numbers Only, No Special Characters Or $): 0
Post-Care Instructions: I reviewed with the patient in detail post-care instructions. Patient should stay away from the sun and wear sun protection until treated areas are fully healed. Provided Vaseline
Consent: Written consent obtained, risks reviewed including but not limited to crusting, scabbing, blistering, scarring, darker or lighter pigmentary change, bruising, and/or incomplete response.
Location: Mohs surgical site scalp
Preprocedure Text: The treatment areas were thoroughly cleaned. The area was treated with no immediate stacking of pulses.
Post Procedure Text: The patient tolerated the procedure well. Post care was reviewed with the patient.
Treatment Number: 1
Density (Optional): 5.5

## 2018-05-22 ENCOUNTER — APPOINTMENT (RX ONLY)
Dept: URBAN - METROPOLITAN AREA CLINIC 36 | Facility: CLINIC | Age: 43
Setting detail: DERMATOLOGY
End: 2018-05-22

## 2018-05-22 DIAGNOSIS — Z48.817 ENCOUNTER FOR SURGICAL AFTERCARE FOLLOWING SURGERY ON THE SKIN AND SUBCUTANEOUS TISSUE: ICD-10-CM

## 2018-05-22 PROCEDURE — ? DRESSING CHANGE

## 2018-05-22 PROCEDURE — 99024 POSTOP FOLLOW-UP VISIT: CPT

## 2018-05-22 PROCEDURE — ? POST-OP WOUND CHECK

## 2018-05-22 ASSESSMENT — LOCATION SIMPLE DESCRIPTION DERM: LOCATION SIMPLE: SCALP

## 2018-05-22 ASSESSMENT — LOCATION ZONE DERM: LOCATION ZONE: SCALP

## 2018-05-22 ASSESSMENT — LOCATION DETAILED DESCRIPTION DERM: LOCATION DETAILED: RIGHT CENTRAL PARIETAL SCALP

## 2018-05-22 NOTE — PROCEDURE: POST-OP WOUND CHECK
Wound Evaluated By: Citlali Lombardo MD
Add 20003 Cpt? (Important Note: In 2017 The Use Of 03231 Is Being Tracked By Cms To Determine Future Global Period Reimbursement For Global Periods): yes
Detail Level: Detailed

## 2018-05-22 NOTE — PROCEDURE: DRESSING CHANGE
Add 63525 Cpt? (Important Note: In 2017 The Use Of 05347 Is Being Tracked By Cms To Determine Future Global Period Reimbursement For Global Periods): yes
Detail Level: Detailed
Wound Evaluated By: Citlali Lombardo MD

## 2018-06-12 ENCOUNTER — APPOINTMENT (RX ONLY)
Dept: URBAN - METROPOLITAN AREA CLINIC 36 | Facility: CLINIC | Age: 43
Setting detail: DERMATOLOGY
End: 2018-06-12

## 2018-06-12 DIAGNOSIS — Z48.817 ENCOUNTER FOR SURGICAL AFTERCARE FOLLOWING SURGERY ON THE SKIN AND SUBCUTANEOUS TISSUE: ICD-10-CM

## 2018-06-12 PROCEDURE — ? FRAXEL

## 2018-06-12 NOTE — PROCEDURE: FRAXEL
Topical Anesthesia Type: 23% Lidocaine 7% Tetracaine
External Cooling: Graciela Cryo 6
Number Of Passes: 4
Consent: Written consent obtained, risks reviewed including but not limited to pain and incomplete improvement.  Two treatments are offered for free and subsequent treatments require a $50 cash fee.
Indication: surgical scars
Post-Care Instructions: I reviewed with the patient in detail post-care instructions.  Apply vaseline to any areas of crusting, expect mild erythema and edema for 48 hours. Patient should avoid sun until area fully healed. Recommend biocorneum cream bid for up to three months.
Number Of Passes: 1
Total Coverage: 25%
Price (Use Numbers Only, No Special Characters Or $): 0
External Cooling Fan Speed: 5
Total Coverage: 10%
Total Coverage: 35%
Energy(Mj/Cm2): 20
Energy(Mj/Cm2): 70
Detail Level: Zone
Location: neck
Treatment Level: 3
Wavelength: 1550nm
Add Post-Care Below To The Note: Yes
Location: full face
Location: decollete of the chest

## 2018-06-21 DIAGNOSIS — Z01.812 PRE-OPERATIVE LABORATORY EXAMINATION: ICD-10-CM

## 2018-06-21 LAB
ANION GAP SERPL CALC-SCNC: 8 MMOL/L (ref 0–11.9)
BUN SERPL-MCNC: 17 MG/DL (ref 8–22)
CALCIUM SERPL-MCNC: 10.2 MG/DL (ref 8.5–10.5)
CHLORIDE SERPL-SCNC: 99 MMOL/L (ref 96–112)
CO2 SERPL-SCNC: 29 MMOL/L (ref 20–33)
CREAT SERPL-MCNC: 1.09 MG/DL (ref 0.5–1.4)
GLUCOSE SERPL-MCNC: 94 MG/DL (ref 65–99)
POTASSIUM SERPL-SCNC: 3.8 MMOL/L (ref 3.6–5.5)
SODIUM SERPL-SCNC: 136 MMOL/L (ref 135–145)

## 2018-06-21 PROCEDURE — 36415 COLL VENOUS BLD VENIPUNCTURE: CPT

## 2018-06-21 PROCEDURE — 80048 BASIC METABOLIC PNL TOTAL CA: CPT

## 2018-07-03 ENCOUNTER — HOSPITAL ENCOUNTER (OUTPATIENT)
Dept: RADIOLOGY | Facility: MEDICAL CENTER | Age: 43
End: 2018-07-03

## 2018-07-05 ENCOUNTER — HOSPITAL ENCOUNTER (OUTPATIENT)
Facility: MEDICAL CENTER | Age: 43
End: 2018-07-06
Attending: OTOLARYNGOLOGY | Admitting: OTOLARYNGOLOGY
Payer: COMMERCIAL

## 2018-07-05 PROCEDURE — 700101 HCHG RX REV CODE 250: Performed by: OTOLARYNGOLOGY

## 2018-07-05 PROCEDURE — 700101 HCHG RX REV CODE 250

## 2018-07-05 PROCEDURE — 500331 HCHG COTTONOID, SURG PATTIE: Performed by: OTOLARYNGOLOGY

## 2018-07-05 PROCEDURE — 700111 HCHG RX REV CODE 636 W/ 250 OVERRIDE (IP)

## 2018-07-05 PROCEDURE — 501838 HCHG SUTURE GENERAL: Performed by: OTOLARYNGOLOGY

## 2018-07-05 PROCEDURE — 160009 HCHG ANES TIME/MIN: Performed by: OTOLARYNGOLOGY

## 2018-07-05 PROCEDURE — 160041 HCHG SURGERY MINUTES - EA ADDL 1 MIN LEVEL 4: Performed by: OTOLARYNGOLOGY

## 2018-07-05 PROCEDURE — 700111 HCHG RX REV CODE 636 W/ 250 OVERRIDE (IP): Performed by: OTOLARYNGOLOGY

## 2018-07-05 PROCEDURE — 160035 HCHG PACU - 1ST 60 MINS PHASE I: Performed by: OTOLARYNGOLOGY

## 2018-07-05 PROCEDURE — 160029 HCHG SURGERY MINUTES - 1ST 30 MINS LEVEL 4: Performed by: OTOLARYNGOLOGY

## 2018-07-05 PROCEDURE — 500125 HCHG BOVIE, HANDLE: Performed by: OTOLARYNGOLOGY

## 2018-07-05 PROCEDURE — 88311 DECALCIFY TISSUE: CPT

## 2018-07-05 PROCEDURE — 96374 THER/PROPH/DIAG INJ IV PUSH: CPT

## 2018-07-05 PROCEDURE — A9270 NON-COVERED ITEM OR SERVICE: HCPCS | Performed by: OTOLARYNGOLOGY

## 2018-07-05 PROCEDURE — 700105 HCHG RX REV CODE 258: Performed by: OTOLARYNGOLOGY

## 2018-07-05 PROCEDURE — A9270 NON-COVERED ITEM OR SERVICE: HCPCS

## 2018-07-05 PROCEDURE — 160048 HCHG OR STATISTICAL LEVEL 1-5: Performed by: OTOLARYNGOLOGY

## 2018-07-05 PROCEDURE — 88305 TISSUE EXAM BY PATHOLOGIST: CPT

## 2018-07-05 PROCEDURE — C1894 INTRO/SHEATH, NON-LASER: HCPCS | Performed by: OTOLARYNGOLOGY

## 2018-07-05 PROCEDURE — 160002 HCHG RECOVERY MINUTES (STAT): Performed by: OTOLARYNGOLOGY

## 2018-07-05 PROCEDURE — G0378 HOSPITAL OBSERVATION PER HR: HCPCS

## 2018-07-05 PROCEDURE — 700102 HCHG RX REV CODE 250 W/ 637 OVERRIDE(OP): Performed by: OTOLARYNGOLOGY

## 2018-07-05 PROCEDURE — 700102 HCHG RX REV CODE 250 W/ 637 OVERRIDE(OP)

## 2018-07-05 PROCEDURE — 160036 HCHG PACU - EA ADDL 30 MINS PHASE I: Performed by: OTOLARYNGOLOGY

## 2018-07-05 PROCEDURE — 502573 HCHG PACK, ENT: Performed by: OTOLARYNGOLOGY

## 2018-07-05 RX ORDER — LIDOCAINE HYDROCHLORIDE AND EPINEPHRINE 10; 10 MG/ML; UG/ML
INJECTION, SOLUTION INFILTRATION; PERINEURAL
Status: DISCONTINUED | OUTPATIENT
Start: 2018-07-05 | End: 2018-07-05 | Stop reason: HOSPADM

## 2018-07-05 RX ORDER — SODIUM CHLORIDE, SODIUM LACTATE, POTASSIUM CHLORIDE, CALCIUM CHLORIDE 600; 310; 30; 20 MG/100ML; MG/100ML; MG/100ML; MG/100ML
INJECTION, SOLUTION INTRAVENOUS CONTINUOUS
Status: DISCONTINUED | OUTPATIENT
Start: 2018-07-05 | End: 2018-07-06 | Stop reason: HOSPADM

## 2018-07-05 RX ORDER — HYDROCODONE BITARTRATE AND ACETAMINOPHEN 5; 325 MG/1; MG/1
1 TABLET ORAL EVERY 4 HOURS PRN
Status: DISCONTINUED | OUTPATIENT
Start: 2018-07-05 | End: 2018-07-06 | Stop reason: HOSPADM

## 2018-07-05 RX ORDER — SIMVASTATIN 10 MG
10 TABLET ORAL EVERY EVENING
Status: DISCONTINUED | OUTPATIENT
Start: 2018-07-05 | End: 2018-07-06 | Stop reason: HOSPADM

## 2018-07-05 RX ORDER — LISINOPRIL 10 MG/1
10 TABLET ORAL
Status: DISCONTINUED | OUTPATIENT
Start: 2018-07-05 | End: 2018-07-06 | Stop reason: HOSPADM

## 2018-07-05 RX ORDER — OXYCODONE HCL 5 MG/5 ML
SOLUTION, ORAL ORAL
Status: COMPLETED
Start: 2018-07-05 | End: 2018-07-05

## 2018-07-05 RX ORDER — LORATADINE 10 MG/1
10 TABLET ORAL DAILY
Status: DISCONTINUED | OUTPATIENT
Start: 2018-07-05 | End: 2018-07-06 | Stop reason: HOSPADM

## 2018-07-05 RX ORDER — ONDANSETRON 2 MG/ML
4 INJECTION INTRAMUSCULAR; INTRAVENOUS EVERY 8 HOURS PRN
Status: DISCONTINUED | OUTPATIENT
Start: 2018-07-05 | End: 2018-07-06 | Stop reason: HOSPADM

## 2018-07-05 RX ORDER — LIDOCAINE HYDROCHLORIDE AND EPINEPHRINE 10; 10 MG/ML; UG/ML
INJECTION, SOLUTION INFILTRATION; PERINEURAL
Status: COMPLETED
Start: 2018-07-05 | End: 2018-07-05

## 2018-07-05 RX ORDER — OXYMETAZOLINE HYDROCHLORIDE 0.05 G/100ML
SPRAY NASAL
Status: COMPLETED
Start: 2018-07-05 | End: 2018-07-05

## 2018-07-05 RX ORDER — BACITRACIN ZINC 500 [USP'U]/G
OINTMENT TOPICAL
Status: DISCONTINUED | OUTPATIENT
Start: 2018-07-05 | End: 2018-07-05 | Stop reason: HOSPADM

## 2018-07-05 RX ORDER — BACITRACIN ZINC 500 [USP'U]/G
OINTMENT TOPICAL
Status: COMPLETED
Start: 2018-07-05 | End: 2018-07-05

## 2018-07-05 RX ORDER — EPINEPHRINE 1 MG/ML(1)
AMPUL (ML) INJECTION
Status: DISCONTINUED | OUTPATIENT
Start: 2018-07-05 | End: 2018-07-05 | Stop reason: HOSPADM

## 2018-07-05 RX ORDER — ACETAMINOPHEN 325 MG/1
650 TABLET ORAL EVERY 6 HOURS
Status: DISCONTINUED | OUTPATIENT
Start: 2018-07-05 | End: 2018-07-06 | Stop reason: HOSPADM

## 2018-07-05 RX ORDER — LISINOPRIL AND HYDROCHLOROTHIAZIDE 12.5; 1 MG/1; MG/1
1 TABLET ORAL
Status: DISCONTINUED | OUTPATIENT
Start: 2018-07-05 | End: 2018-07-05

## 2018-07-05 RX ORDER — HYDROCHLOROTHIAZIDE 12.5 MG/1
12.5 TABLET ORAL
Status: DISCONTINUED | OUTPATIENT
Start: 2018-07-05 | End: 2018-07-06 | Stop reason: HOSPADM

## 2018-07-05 RX ORDER — EPINEPHRINE 1 MG/ML
INJECTION INTRAMUSCULAR; INTRAVENOUS; SUBCUTANEOUS
Status: COMPLETED
Start: 2018-07-05 | End: 2018-07-05

## 2018-07-05 RX ADMIN — CEFAZOLIN SODIUM 1000 MG: 1 INJECTION, SOLUTION INTRAVENOUS at 17:53

## 2018-07-05 RX ADMIN — OXYCODONE HYDROCHLORIDE 10 MG: 5 SOLUTION ORAL at 12:19

## 2018-07-05 RX ADMIN — HYDROCODONE BITARTRATE AND ACETAMINOPHEN 1 TABLET: 5; 325 TABLET ORAL at 16:02

## 2018-07-05 RX ADMIN — SODIUM CHLORIDE, POTASSIUM CHLORIDE, SODIUM LACTATE AND CALCIUM CHLORIDE: 600; 310; 30; 20 INJECTION, SOLUTION INTRAVENOUS at 15:59

## 2018-07-05 RX ADMIN — HYDROCODONE BITARTRATE AND ACETAMINOPHEN 1 TABLET: 5; 325 TABLET ORAL at 20:18

## 2018-07-05 RX ADMIN — SIMVASTATIN 10 MG: 10 TABLET, FILM COATED ORAL at 20:18

## 2018-07-05 RX ADMIN — SODIUM CHLORIDE, SODIUM LACTATE, POTASSIUM CHLORIDE, CALCIUM CHLORIDE: 600; 310; 30; 20 INJECTION, SOLUTION INTRAVENOUS at 07:25

## 2018-07-05 ASSESSMENT — LIFESTYLE VARIABLES
HOW MANY TIMES IN THE PAST YEAR HAVE YOU HAD 5 OR MORE DRINKS IN A DAY: 0
TOTAL SCORE: 0
ALCOHOL_USE: YES
AVERAGE NUMBER OF DAYS PER WEEK YOU HAVE A DRINK CONTAINING ALCOHOL: 1
TOTAL SCORE: 0
EVER HAD A DRINK FIRST THING IN THE MORNING TO STEADY YOUR NERVES TO GET RID OF A HANGOVER: NO
EVER FELT BAD OR GUILTY ABOUT YOUR DRINKING: NO
CONSUMPTION TOTAL: NEGATIVE
TOTAL SCORE: 0
EVER_SMOKED: NEVER
HAVE PEOPLE ANNOYED YOU BY CRITICIZING YOUR DRINKING: NO
ON A TYPICAL DAY WHEN YOU DRINK ALCOHOL HOW MANY DRINKS DO YOU HAVE: 1
HAVE YOU EVER FELT YOU SHOULD CUT DOWN ON YOUR DRINKING: NO

## 2018-07-05 ASSESSMENT — PATIENT HEALTH QUESTIONNAIRE - PHQ9
2. FEELING DOWN, DEPRESSED, IRRITABLE, OR HOPELESS: NOT AT ALL
1. LITTLE INTEREST OR PLEASURE IN DOING THINGS: NOT AT ALL
SUM OF ALL RESPONSES TO PHQ9 QUESTIONS 1 AND 2: 0

## 2018-07-05 ASSESSMENT — PAIN SCALES - GENERAL
PAINLEVEL_OUTOF10: 1
PAINLEVEL_OUTOF10: 1
PAINLEVEL_OUTOF10: 0
PAINLEVEL_OUTOF10: 0
PAINLEVEL_OUTOF10: 1
PAINLEVEL_OUTOF10: 5
PAINLEVEL_OUTOF10: 0
PAINLEVEL_OUTOF10: 1
PAINLEVEL_OUTOF10: 0

## 2018-07-05 ASSESSMENT — COGNITIVE AND FUNCTIONAL STATUS - GENERAL
MOBILITY SCORE: 23
CLIMB 3 TO 5 STEPS WITH RAILING: A LITTLE
SUGGESTED CMS G CODE MODIFIER MOBILITY: CI
SUGGESTED CMS G CODE MODIFIER DAILY ACTIVITY: CH
DAILY ACTIVITIY SCORE: 24

## 2018-07-05 NOTE — OP REPORT
DATE OF SERVICE:  07/05/2018    PREOPERATIVE DIAGNOSIS:  Chronic sinusitis.    POSTOPERATIVE DIAGNOSES:  Chronic sinusitis with polyps.    SURGEON:  Cesario Sim MD    ASSISTANT:  None.    ANESTHESIA:  General endotracheal.    ANESTHESIOLOGIST:  Jett Scott DO    ESTIMATED BLOOD LOSS:  50 mL.    COMPLICATIONS:  None.    SPECIMENS:  Right and left sinus contents.    PROCEDURES PERFORMED:  1.  Bilateral total ethmoidectomy.  2.  Bilateral maxillary antrostomy.  3.  Bilateral sphenoidotomies.  4.  Right frontal sinusotomy.  5.  Left tawanda bullosa resection.  6.  With image guidance.    INDICATIONS:  This is a 43-year-old who previously had nasal surgery.  He   continues to have nasal obstruction.  He does have findings on exam consistent   with valve collapse.  CT scan revealed diffuse thickening throughout his   sinuses.  He has failed Pulmicort rinses.  He does have sleep apnea.  He   continues to have nasal obstruction.  After discussing the risks, benefits,   and alternatives, he elected to undergo the above procedure.  I did have him   speak to my partner, Dr. Collazo, and he recommended that we stage procedures   in case his nose improves significantly, so he does not need to have a valve   repaired down the road.  The patient understands he may need to have a   functional rhinoplasty down the road.    FINDINGS:  The septum was pretty straight.  There was a very mild right   anterior deviation.  There was a left tawanda bullosa.  There was polypoid   mucosa throughout the ethmoids.  I was able to cannulate the right frontal   with the image guidance probe.  It was quite narrow, so I was unable to widen   it further.  He had polypoid mucosa throughout the sinuses.    DESCRIPTION OF PROCEDURE:  The patient was brought to the operating room,   correctly identified as the patient.  He was intubated by anesthesia without   difficulty, turned 90 degrees, given preoperative antibiotics.  A timeout was    performed.  A 1:1000 epinephrine soaked pledgets stained with fluoroscein were   placed in the nasal cavity.  Once vasoconstriction was allowed to take   effect, I injected the right and left maxillary lines and sphenopalatine   region with 1% lidocaine, 1:100,000 epinephrine.  A timeout had been performed   previously.  I began sinus surgery on the left hand side.  I made a stab   incision to the anterior aspect of the tawanda and resected the lateral lamella   using a straight Keith-Cut and microdebrider.  I then resected the uncinate   using a pediatric backbiter, microdebrider and flat right angle spoon.  I   identified the natural opening of the maxillary and widened it posteriorly   using curved endoscopic scissors, side biter and microdebrider.  I had   previously registered the image guidance and it was found to be in good   fidelity in all 3 planes.  This was required for this case because of the   polyps involvement of the frontal sphenoid.  I entered the bulla and resected   using a spoon and microdebrider.  I went through the basal lamella at the   intersection of the horizontal and vertical portions of it.  I entered the   posterior ethmoid cells and entered the Onodi cell.  I resected the inferior   aspect of the superior turbinate.  I identified the natural opening of   sphenoid and widened it inferiorly using a flat right angle spoon, Kerrisons.    I then widened it as much as I could using Kerrisons and the mushroom punch.    I then cleared partitions in a posterior to anterior manner.  There was not   much of an agger cell, so I could not to an agger nasi punch.  I was unable to   cannulate the frontal.  The anatomy was distorted because of the tawanda.      At   this point,I was happy with my dissection, turned my attention to the   right hand side.  I resected the uncinate using a pediatric backbiter, flat   right angle spoon, microdebrider and Kerrisons.  I used a 30-degree endoscope   to  visualize the natural opening of the maxillary and widened it posteriorly   using curved endoscopic scissor, side biter and microdebrider.  I entered the   bulla and resected it using a spoon and microdebrider.  I went through the   basal lamella at the intersection of horizontal and vertical portions of it.    I visualized the superior turbinate.  I entered the cell just in front of the   sphenoid.  I then identified the natural opening of the sphenoid and widened   it inferior using a flat right angle spoon and Kerrisons.  There was some   bleeding from the middle turbinate posterior branch, which I suction cautery   with a coag of 15.  I then cleared partitions in a posterior to anterior   manner.  I performed an agger nasi punch on this side.  I was able to   cannulate the frontal using the image guidance probe, it was pretty tight, so   I did not want to widen it since he was having minimal headache symptoms.  At   this point, I was happy with my dissection.  Hemostasis was good.  I placed a   bacitracin-coated finger cot in each middle meatus and secured it around with   2x2.  The patient was then awakened from anesthesia and taken to recovery room   in stable condition.  All counts were correct.       ____________________________________     MD OSKAR Pollard / KATERYNA    DD:  07/05/2018 11:23:51  DT:  07/05/2018 11:48:34    D#:  [id number]  Job#:  [id number]    cc: VERONICA SANTOS MD

## 2018-07-05 NOTE — OR NURSING
1325 Report received from [place]. No bleeding noted from nostrils at this time. Drip pad in place. Pt sleeping comfortably.  1355 Update given to transferring RN. Patient transport placed. Family at bedside. Update given to [place] RN and care transferred.

## 2018-07-05 NOTE — OR NURSING
1126 Patient arrived from OR on [facility]. Report received from RN and Anesthesia. Patient's VS WNL, patient arousable, stable, breathing even/non labored.     1141 DR. Sim at bedside, per doctor packing has to be removed before he goes to room    1311 Significant other at bedside, nasal packing removed as per patient's request    1318 patient ambulated to bathroom, gait steady    1319 Report called to WONG Holliday in [facility].    1330 Hand-off to WONG Delgado    1404 Transport at bedside, patient has all belongings.

## 2018-07-05 NOTE — OR SURGEON
Immediate Post OP Note    PreOp Diagnosis: CRS    PostOp Diagnosis: same    Procedure(s):  1. B total ethmoid  2. B maxillary antrostomies  3. L tawanda   4. B sphenoid  5. Right frontal   6. With IGS   Surgeon(s):  Cesario Sim M.D.    Anesthesiologist/Type of Anesthesia:  Anesthesiologist: Jett Scott D.O./General    Surgical Staff:  Circulator: Dorinda Jacobs R.N.  Relief Circulator: Liza Chavez R.N.  Scrub Person: Angelina Jennings    Specimens removed if any:  * No specimens in log *    Estimated Blood Loss: 50cc    Findings: Polypoid mucosa    Complications: none        7/5/2018 11:14 AM Cesario Sim M.D.

## 2018-07-06 VITALS
HEIGHT: 72 IN | TEMPERATURE: 98.4 F | RESPIRATION RATE: 16 BRPM | DIASTOLIC BLOOD PRESSURE: 62 MMHG | HEART RATE: 71 BPM | BODY MASS INDEX: 42.66 KG/M2 | WEIGHT: 315 LBS | SYSTOLIC BLOOD PRESSURE: 132 MMHG | OXYGEN SATURATION: 97 %

## 2018-07-06 PROCEDURE — 700102 HCHG RX REV CODE 250 W/ 637 OVERRIDE(OP): Performed by: OTOLARYNGOLOGY

## 2018-07-06 PROCEDURE — 96376 TX/PRO/DX INJ SAME DRUG ADON: CPT

## 2018-07-06 PROCEDURE — 700111 HCHG RX REV CODE 636 W/ 250 OVERRIDE (IP): Performed by: OTOLARYNGOLOGY

## 2018-07-06 PROCEDURE — 700105 HCHG RX REV CODE 258: Performed by: OTOLARYNGOLOGY

## 2018-07-06 PROCEDURE — A9270 NON-COVERED ITEM OR SERVICE: HCPCS | Performed by: OTOLARYNGOLOGY

## 2018-07-06 PROCEDURE — G0378 HOSPITAL OBSERVATION PER HR: HCPCS

## 2018-07-06 RX ADMIN — SODIUM CHLORIDE, POTASSIUM CHLORIDE, SODIUM LACTATE AND CALCIUM CHLORIDE: 600; 310; 30; 20 INJECTION, SOLUTION INTRAVENOUS at 01:43

## 2018-07-06 RX ADMIN — LORATADINE 10 MG: 10 TABLET ORAL at 08:43

## 2018-07-06 RX ADMIN — CEFAZOLIN SODIUM 1000 MG: 1 INJECTION, SOLUTION INTRAVENOUS at 01:43

## 2018-07-06 RX ADMIN — HYDROCODONE BITARTRATE AND ACETAMINOPHEN 1 TABLET: 5; 325 TABLET ORAL at 05:40

## 2018-07-06 ASSESSMENT — PAIN SCALES - GENERAL
PAINLEVEL_OUTOF10: 8
PAINLEVEL_OUTOF10: 0
PAINLEVEL_OUTOF10: 0

## 2018-07-06 NOTE — PROGRESS NOTES
"Pt declining scheduled medications stating \"I already took my home lisinopril this morning, I don't need anymore BP medications\".   Educated pt not to take his own medications as we will provide them for him. Educated pt to send medications home. Pt verbalizes understanding and states no one let him know about this rule and pt was apologetic.   "

## 2018-07-06 NOTE — PROGRESS NOTES
Pt A&Ox4, sitting up for meal.  Saturating >90% on RA. Denies SOB. Airway sounds clear. Pt denies drainage, denies pain.  Tolerating regular diet, denies n/v. + bowel sounds, (-) flatus, LBM prior to surgery.  Pt ambulates independently, demonstrates steady gait.  Updated on plan of care. Safety education provided. Bed locked in low. Call light within reach. Rounding in place.     Planning for d/c today.

## 2018-07-06 NOTE — PROGRESS NOTES
"Bedside Report received   Assumed care of Mr Dela Cruz at 1900.    Pt is A&O x4.  Pain tolerable, but pt is requesting his PRN norco as it is a home medication and he likes to stay ahead of his pain. Pt educated on 0-10 pain scale and need to deliver medication based on this scale. Pt verbalizes understanding.  Nausea denied  Tolerating Diet   Nasal sling in place to PRN. Scant serosanguinous drainage noted. Pt educated on need to keep HOB elevated +30 degrees   Pt describes moderate amounts of bloody drainage \"coming down\" the back of his throat. Yankauer provided. Pt educated on use and need to suck/spit out drainage rather than swallowing it.   + Urine output  - BM   - Flatus  Up self with steady gait  SCD's refused despite education on DVT risk  Family at bedside  Bed in lowest position and locked.  Bed alarm NA per Zenaida Ng  Pt resting comfortably now.  Review plan of care with patient.  Call light within reach  Hourly rounds in place  All needs met at this time  "

## 2018-07-06 NOTE — PROGRESS NOTES
Discharging Patient home per physician order.  Discharged with friend.  Demonstrated understanding of discharge instructions, follow up appointments, home medications, prescriptions, home care for surgical wound, and nursing care instructions for nasal surgery after care.  Ambulating without assistance, voiding without difficulty, pain well controlled, tolerating oral medications, oxygen saturation greater than 90% on RA, tolerating diet. Educational handouts  given and discussed.  Verbalized understanding of discharge instructions and educational handouts.  All questions answered.  Patient able to state several reasons why to return to the ED or seek medical attention. Belongings with patient at time of discharge.

## 2018-07-06 NOTE — DISCHARGE INSTRUCTIONS
Discharge Instructions    Discharged to home by car with relative. Discharged via wheelchair, hospital escort: Yes.  Special equipment needed: Not Applicable    Be sure to schedule a follow-up appointment with your primary care doctor or any specialists as instructed.     Discharge Plan:   Influenza Vaccine Indication: Patient Refuses    I understand that a diet low in cholesterol, fat, and sodium is recommended for good health. Unless I have been given specific instructions below for another diet, I accept this instruction as my diet prescription.    Special Instructions: None    · Is patient discharged on Warfarin / Coumadin?   No     Depression / Suicide Risk    As you are discharged from this [facility] facility, it is important to learn how to keep safe from harming yourself.    Recognize the warning signs:  · Abrupt changes in personality, positive or negative- including increase in energy   · Giving away possessions  · Change in eating patterns- significant weight changes-  positive or negative  · Change in sleeping patterns- unable to sleep or sleeping all the time   · Unwillingness or inability to communicate  · Depression  · Unusual sadness, discouragement and loneliness  · Talk of wanting to die  · Neglect of personal appearance   · Rebelliousness- reckless behavior  · Withdrawal from people/activities they love  · Confusion- inability to concentrate     If you or a loved one observes any of these behaviors or has concerns about self-harm, here's what you can do:  · Talk about it- your feelings and reasons for harming yourself  · Remove any means that you might use to hurt yourself (examples: pills, rope, extension cords, firearm)  · Get professional help from the community (Mental Health, Substance Abuse, psychological counseling)  · Do not be alone:Call your Safe Contact- someone whom you trust who will be there for you.  · Call your local CRISIS HOTLINE 035-2037 or 995-012-4413  · Call your local  Children's Mobile Crisis Response Team [place] (850) 817-7960 or www.[employer]  · Call the toll free National Suicide Prevention Hotlines   · National Suicide Prevention Lifeline 291-982-FXLA (0392)  · [employer] Line Network 800-SUICIDE (872-7596)      NASAL SURGERY POST-OPERATIVE  PATIENT INSTRUCTIONS        WOUND CARE INSTRUCTIONS:    Do not blow your nose until instructed or remove any packing unless instructed to do so. Wipe or dab the nose gently with a q-tip and hydrogen peroxide.  Change the dressing under the nose (if present) as needed.  Once drainage has stopped you no longer need to keep a dressing in place.  Brush your teeth gently with a soft tooth brush only.  Wash your face carefully, avoiding the dressing and taking care not to get splint wet, if present.  Sleep with your head up and only in the supine position.  Don't turn your head side to side.      DIET:  You may eat any foods that you can tolerate.  It is a good idea to eat a high fiber diet and take in plenty of fluids to prevent constipation.  If you do become constipated you may want to take a mild laxative or take ducolax tablets on a daily basis until your bowel habits are regular.      MEDICATIONS:  Try to take narcotic medications and anti-inflammatory medications, such as tylenol, ibuprofen, naprosyn, etc., with food.  This will minimize stomach upset from the medication.  Should you develop nausea and vomiting from the pain medication, or develop a rash, please discontinue the medication and contact your physician.  You should not drive, make important decisions, or operate machinery when taking narcotic pain medication.    QUESTIONS:  Please feel free to call your physician or the hospital  if you have any questions, and they will be glad to assist you.      Saline rinses 4 times a day.   Take tylenol for pain.    antibiotic at [facility] [place] [address].   Follow up with Dr. Sim.

## 2018-07-06 NOTE — PROGRESS NOTES
Patient to floor at 1452. AO x 4, irritated that he had to spend night.  Requesting htat RN call md to ask when he will be here tomorrow  This RN called and updated patient that  told this RN that MD will likely be here around lunch.  Patient stated he will leave at lunch if MD has not arrived yet. Pain level 1/10, down to 0/10 in nose post pain med.  Small amount of bloody drainage from nose, drip pad changed x 1.  Nose congested, head of bed up, education provided to patient to not blow nose and to sneeze through mouth. Fall prevention and safety discussed.  Plan of care discussed, questions answered, verbalized understanding.

## 2018-07-06 NOTE — DIETARY
NUTRITION SERVICES: BMI - Pt with BMI >40 (=43.1). Weight loss counseling not appropriate in acute care setting. RECOMMEND - Referral to outpatient nutrition services for weight management after D/C.

## 2018-07-14 DIAGNOSIS — E78.00 PURE HYPERCHOLESTEROLEMIA: ICD-10-CM

## 2018-07-16 RX ORDER — SIMVASTATIN 10 MG
TABLET ORAL
Qty: 90 TAB | Refills: 3 | OUTPATIENT
Start: 2018-07-16

## 2018-07-16 NOTE — PROGRESS NOTES
Discharge summary  Patient underwent uncomplicated sinus surgery.  He was admitted overnight for observation given his BENITA.  No desats overnight, pain was well controlled. No bleeding  Restart home meds - no narcotics, keflex was escribed.   Follow up 1 week  neilmed rinses 5x/day start tomorrow

## 2018-07-16 NOTE — TELEPHONE ENCOUNTER
Was the patient seen in the last year in this department? NO    Does patient have an active prescription for medications requested? Yes     Received Request Via: Pharmacy

## 2018-07-17 NOTE — TELEPHONE ENCOUNTER
FYI    Phone Number Called: [facility] pharmacy [phone]    Message: Informed pharmacy of PCP change and they updated their records.     Left Message for patient to call back: no

## 2018-07-17 NOTE — TELEPHONE ENCOUNTER
Refill declined.  I have not seen patient over a year.  Patient already established care with new PCP.  Please call  pharmacy to request refill from his current PCP.      Veronica Lockhart M.D.

## 2018-08-13 DIAGNOSIS — Z01.812 PRE-OPERATIVE LABORATORY EXAMINATION: ICD-10-CM

## 2018-08-13 LAB
ANION GAP SERPL CALC-SCNC: 8 MMOL/L (ref 0–11.9)
BUN SERPL-MCNC: 11 MG/DL (ref 8–22)
CALCIUM SERPL-MCNC: 10.2 MG/DL (ref 8.5–10.5)
CHLORIDE SERPL-SCNC: 100 MMOL/L (ref 96–112)
CO2 SERPL-SCNC: 27 MMOL/L (ref 20–33)
CREAT SERPL-MCNC: 0.83 MG/DL (ref 0.5–1.4)
GLUCOSE SERPL-MCNC: 85 MG/DL (ref 65–99)
POTASSIUM SERPL-SCNC: 3.7 MMOL/L (ref 3.6–5.5)
SODIUM SERPL-SCNC: 135 MMOL/L (ref 135–145)

## 2018-08-13 PROCEDURE — 36415 COLL VENOUS BLD VENIPUNCTURE: CPT

## 2018-08-13 PROCEDURE — 80048 BASIC METABOLIC PNL TOTAL CA: CPT

## 2018-08-15 ENCOUNTER — HOSPITAL ENCOUNTER (OUTPATIENT)
Facility: MEDICAL CENTER | Age: 43
End: 2018-08-15
Attending: SURGERY | Admitting: SURGERY
Payer: COMMERCIAL

## 2018-08-15 VITALS
TEMPERATURE: 97.1 F | WEIGHT: 313.05 LBS | DIASTOLIC BLOOD PRESSURE: 58 MMHG | HEIGHT: 72 IN | SYSTOLIC BLOOD PRESSURE: 144 MMHG | RESPIRATION RATE: 18 BRPM | OXYGEN SATURATION: 94 % | BODY MASS INDEX: 42.4 KG/M2 | HEART RATE: 55 BPM

## 2018-08-15 DIAGNOSIS — G89.18 ACUTE POST-OPERATIVE PAIN: ICD-10-CM

## 2018-08-15 DIAGNOSIS — G89.18 POST-OP PAIN: ICD-10-CM

## 2018-08-15 PROCEDURE — 160046 HCHG PACU - 1ST 60 MINS PHASE II: Performed by: SURGERY

## 2018-08-15 PROCEDURE — 160025 RECOVERY II MINUTES (STATS): Performed by: SURGERY

## 2018-08-15 PROCEDURE — 700101 HCHG RX REV CODE 250

## 2018-08-15 PROCEDURE — 160027 HCHG SURGERY MINUTES - 1ST 30 MINS LEVEL 2: Performed by: SURGERY

## 2018-08-15 PROCEDURE — 501411 HCHG SPONGE, BABY LAP W/O RINGS: Performed by: SURGERY

## 2018-08-15 PROCEDURE — 700111 HCHG RX REV CODE 636 W/ 250 OVERRIDE (IP)

## 2018-08-15 PROCEDURE — 160035 HCHG PACU - 1ST 60 MINS PHASE I: Performed by: SURGERY

## 2018-08-15 PROCEDURE — 501838 HCHG SUTURE GENERAL: Performed by: SURGERY

## 2018-08-15 PROCEDURE — 700102 HCHG RX REV CODE 250 W/ 637 OVERRIDE(OP)

## 2018-08-15 PROCEDURE — 160038 HCHG SURGERY MINUTES - EA ADDL 1 MIN LEVEL 2: Performed by: SURGERY

## 2018-08-15 PROCEDURE — 160009 HCHG ANES TIME/MIN: Performed by: SURGERY

## 2018-08-15 PROCEDURE — C1781 MESH (IMPLANTABLE): HCPCS | Performed by: SURGERY

## 2018-08-15 PROCEDURE — 160036 HCHG PACU - EA ADDL 30 MINS PHASE I: Performed by: SURGERY

## 2018-08-15 PROCEDURE — 160048 HCHG OR STATISTICAL LEVEL 1-5: Performed by: SURGERY

## 2018-08-15 PROCEDURE — A9270 NON-COVERED ITEM OR SERVICE: HCPCS

## 2018-08-15 PROCEDURE — 160002 HCHG RECOVERY MINUTES (STAT): Performed by: SURGERY

## 2018-08-15 DEVICE — MESH VENTRALEX ST W/STRAP - 4.3CM SMALL (1EA/CA): Type: IMPLANTABLE DEVICE | Status: FUNCTIONAL

## 2018-08-15 RX ORDER — SODIUM CHLORIDE, SODIUM LACTATE, POTASSIUM CHLORIDE, CALCIUM CHLORIDE 600; 310; 30; 20 MG/100ML; MG/100ML; MG/100ML; MG/100ML
INJECTION, SOLUTION INTRAVENOUS CONTINUOUS
Status: DISCONTINUED | OUTPATIENT
Start: 2018-08-15 | End: 2018-08-15 | Stop reason: HOSPADM

## 2018-08-15 RX ORDER — OXYCODONE HCL 5 MG/5 ML
SOLUTION, ORAL ORAL
Status: COMPLETED
Start: 2018-08-15 | End: 2018-08-15

## 2018-08-15 RX ORDER — BUPIVACAINE HYDROCHLORIDE AND EPINEPHRINE 2.5; 5 MG/ML; UG/ML
INJECTION, SOLUTION EPIDURAL; INFILTRATION; INTRACAUDAL; PERINEURAL
Status: DISCONTINUED | OUTPATIENT
Start: 2018-08-15 | End: 2018-08-15 | Stop reason: HOSPADM

## 2018-08-15 RX ORDER — LIDOCAINE HYDROCHLORIDE 10 MG/ML
0.5 INJECTION, SOLUTION INFILTRATION; PERINEURAL
Status: DISCONTINUED | OUTPATIENT
Start: 2018-08-15 | End: 2018-08-15 | Stop reason: HOSPADM

## 2018-08-15 RX ORDER — LIDOCAINE HYDROCHLORIDE 10 MG/ML
INJECTION, SOLUTION INFILTRATION; PERINEURAL
Status: DISCONTINUED
Start: 2018-08-15 | End: 2018-08-15 | Stop reason: HOSPADM

## 2018-08-15 RX ORDER — OXYCODONE HYDROCHLORIDE 5 MG/1
10 TABLET ORAL EVERY 6 HOURS PRN
Qty: 40 TAB | Refills: 0 | Status: SHIPPED | OUTPATIENT
Start: 2018-08-15 | End: 2018-08-22

## 2018-08-15 RX ORDER — ONDANSETRON 4 MG/1
4 TABLET, FILM COATED ORAL EVERY 4 HOURS PRN
Qty: 20 TAB | Refills: 0 | Status: SHIPPED | OUTPATIENT
Start: 2018-08-15 | End: 2018-08-22

## 2018-08-15 RX ORDER — GABAPENTIN 300 MG/1
CAPSULE ORAL
Status: DISCONTINUED
Start: 2018-08-15 | End: 2018-08-15 | Stop reason: HOSPADM

## 2018-08-15 RX ORDER — ACETAMINOPHEN 500 MG
TABLET ORAL
Status: DISCONTINUED
Start: 2018-08-15 | End: 2018-08-15 | Stop reason: HOSPADM

## 2018-08-15 RX ADMIN — OXYCODONE HYDROCHLORIDE 10 MG: 5 SOLUTION ORAL at 16:10

## 2018-08-15 ASSESSMENT — PAIN SCALES - GENERAL
PAINLEVEL_OUTOF10: 3
PAINLEVEL_OUTOF10: 0
PAINLEVEL_OUTOF10: 0
PAINLEVEL_OUTOF10: 5
PAINLEVEL_OUTOF10: 0

## 2018-08-15 NOTE — OR SURGEON
Immediate Post OP Note    PreOp Diagnosis: Incarcerated ventral hernai    PostOp Diagnosis: Same    Procedure(s):  VENTRAL HERNIA REPAIR- INCARCERATED WITH MESH - Wound Class: Clean    Surgeon(s):  Cokry Berger M.D.    Anesthesiologist/Type of Anesthesia:  Anesthesiologist: Jennifer Galindo M.D./General    Surgical Staff:  Circulator: Kaylynn Pereyra R.N.; Len Mcghee R.N.  Scrub Person: Fany Becerra  First Assist: PARTHA Conklin    Specimens removed if any:  * No specimens in log *    Estimated Blood Loss: Minimal    Findings: Incarcerated hernia    Complications: None        8/15/2018 3:47 PM PARTHA Conklin

## 2018-08-15 NOTE — PROGRESS NOTES
Pre op assessment complete, pt's VSS, pt updated on POC. Call light within reach, pt denies any other needs at this time.

## 2018-08-16 NOTE — OR NURSING
VSS, pt steady with ambulation, meets discharge criteria. Patient was very anxious to go home. Was already dressed when arrived to Stage 2. Discharged home with family. Walked to car with family escort, very steady, declines wheelchair or assistance. Rx given to pt as written by physician. Jose CDI. IV dc'd, cathlon intact. Pt to f/u with physician as directed by physician. Pt to return to ER for any emergent sx. Pt verbalizes understanding of discharge instructions and all questions were answered.

## 2018-08-16 NOTE — DISCHARGE INSTRUCTIONS
ACTIVITY: Rest and take it easy for the first 24 hours.  A responsible adult is recommended to remain with you during that time.  It is normal to feel sleepy.  We encourage you to not do anything that requires balance, judgment or coordination.    MILD FLU-LIKE SYMPTOMS ARE NORMAL. YOU MAY EXPERIENCE GENERALIZED MUSCLE ACHES, THROAT IRRITATION, HEADACHE AND/OR SOME NAUSEA.    FOR 24 HOURS DO NOT:  Drive, operate machinery or run household appliances.  Drink beer or alcoholic beverages.   Make important decisions or sign legal documents.    SPECIAL INSTRUCTIONS:   D/C instructions:    1. DIET: Upon discharge from the hospital you may resume your normal preoperative diet. Depending on how you are feeling and whether you have nausea or not, you may wish to stay with a bland diet for the first few days. However, you can advance this as quickly as you feel ready.    2. ACTIVITIES: After discharge from the hospital, you may resume full routine activities. However, there should be no heavy lifting (greater than 15 pounds) and no strenuous activities until after your follow-up visit. Otherwise, routine activities of daily living are acceptable.    3. DRIVING: You may drive whenever you are off pain medications and are able to perform the activities needed to drive, i.e. turning, bending, twisting, etc.    4. BATHING: You may get the wound wet at any time after leaving the hospital. You may shower, but do not submerge in a bath for at least a week. Dressings may come off after 48 hours.    5. BOWEL FUNCTION: Constipation is common after an operation, especially with pain medications. The combination of pain medication and decreased activity level can cause constipation in otherwise normal patients. If you feel this is occurring, take a laxative (Milk of Magnesia, Ex-Lax, Senokot, etc.) until the problem has resolved.    6. PAIN MEDICATION: You will be given a prescription for pain medication at discharge. DO NOT take  previously prescribed hydrocodone in addition to the  the current prescription you are being given.   Please take these as directed. It is important to remember not to take medications on an empty stomach as this may cause nausea.      7.CALL IF YOU HAVE: (1) Fevers to more than 1010 F, (2) Unusual chest or leg pain, (3) Drainage or fluid from incision that may be foul smelling, increased tenderness or soreness at the wound or the wound edges are no longer together, redness or swelling at the incision site. Please do not hesitate to call with any other questions.     8. APPOINTMENT: Contact our office at [phone] for a follow-up appointment in 1-2 weeks following your procedure.    If you have any additional questions, please do not hesitate to call the office and speak to either myself or the physician on call.      DIET: To avoid nausea, slowly advance diet as tolerated, avoiding spicy or greasy foods for the first day.  Add more substantial food to your diet according to your physician's instructions.  Babies can be fed formula or breast milk as soon as they are hungry.  INCREASE FLUIDS AND FIBER TO AVOID CONSTIPATION.      FOLLOW-UP APPOINTMENT:  A follow-up appointment should be arranged with your doctor in 2 weeks call to schedule.    You should CALL YOUR PHYSICIAN if you develop:  Fever greater than 101 degrees F.  Pain not relieved by medication, or persistent nausea or vomiting.  Excessive bleeding (blood soaking through dressing) or unexpected drainage from the wound.  Extreme redness or swelling around the incision site, drainage of pus or foul smelling drainage.  Inability to urinate or empty your bladder within 8 hours.  Problems with breathing or chest pain.    You should call 911 if you develop problems with breathing or chest pain.  If you are unable to contact your doctor or surgical center, you should go to the nearest emergency room or urgent care center.  Physician's telephone #:  [phone]    If any questions arise, call your doctor.  If your doctor is not available, please feel free to call the Surgical Center at [phone].  The Center is open Monday through Friday from 7AM to 7PM.  You can also call the HEALTH HOTLINE open 24 hours/day, 7 days/week and speak to a nurse at [phone], or toll free at [phone].    A registered nurse may call you a few days after your surgery to see how you are doing after your procedure.    MEDICATIONS: Resume taking daily medication.  Take prescribed pain medication with food.  If no medication is prescribed, you may take non-aspirin pain medication if needed.  PAIN MEDICATION CAN BE VERY CONSTIPATING.  Take a stool softener or laxative such as senokot, pericolace, or milk of magnesia if needed.    Prescription given for Oxycodone and Zofran.  Last pain medication given at 4:10pm.    If your physician has prescribed pain medication that includes Acetaminophen (Tylenol), do not take additional Acetaminophen (Tylenol) while taking the prescribed medication.    Depression / Suicide Risk    As you are discharged from this [facility] facility, it is important to learn how to keep safe from harming yourself.    Recognize the warning signs:  · Abrupt changes in personality, positive or negative- including increase in energy   · Giving away possessions  · Change in eating patterns- significant weight changes-  positive or negative  · Change in sleeping patterns- unable to sleep or sleeping all the time   · Unwillingness or inability to communicate  · Depression  · Unusual sadness, discouragement and loneliness  · Talk of wanting to die  · Neglect of personal appearance   · Rebelliousness- reckless behavior  · Withdrawal from people/activities they love  · Confusion- inability to concentrate     If you or a loved one observes any of these behaviors or has concerns about self-harm, here's what you can do:  · Talk about it- your feelings and  reasons for harming yourself  · Remove any means that you might use to hurt yourself (examples: pills, rope, extension cords, firearm)  · Get professional help from the community (Mental Health, Substance Abuse, psychological counseling)  · Do not be alone:Call your Safe Contact- someone whom you trust who will be there for you.  · Call your local CRISIS HOTLINE 532-3874 or 069-104-7497  · Call your local Children's Mobile Crisis Response Team [place] (378) 846-1596 or www.[place]  · Call the toll free National Suicide Prevention Hotlines   · National Suicide Prevention Lifeline 214-957-DXND (6635)  · National Hope Line Network 800-SUICIDE (336-5462)

## 2018-09-05 NOTE — HPI: SKIN LESION
141
Is This A New Presentation, Or A Follow-Up?: Growth
How Severe Is Your Skin Lesion?: mild
Has Your Skin Lesion Been Treated?: not been treated

## 2018-09-09 NOTE — OP REPORT
DATE OF OPERATION: 8/15/2018     PREOPERATIVE DIAGNOSIS: Midline Ventral Hernia    POSTOPERATIVE DIAGNOSIS: Same.     PROCEDURE:  Ventral Hernia Repair with Mesh    SURGEON: Corky Berger    ASSISTANT: ENE owens    ANESTHESIOLOGIST: REGAN Galindo MD    ANESTHESIA: General      INDICATIONS: The patient is a 43 y.o. male with a symptomatic ventral hernia.   Procedure, alternatives and risks were discussed with the patient or guardian.  We specifically discussed risk of bleeding, infection, injury to intestines, mesh erosion, hernia recurrence.  Questions were answered and they wished to proceed.    OPERATION:  The abdomen was prepped and draped in sterile fashion.  A midline   incision was made.  The incarcerated hernia was  from the   subcutaneous tissues and subsequently reduced.  The fascia was then elevated   and the preperitoneal space developed.  Once an adequate space had been   developed, a Ventralex ST mesh (Bard) was placed.  The mesh lay evenly and   flat against the fascia.  The fascia was then closed over the mesh using   interrupted 0 neurolon suture.  The wound was irrigated.  There was no active   hemorrhage.  Sponge and needle counts correct x2.  The subcutaneous tissues   were closed using interrupted 3-0 Vicryl.  Skin was closed using a running 4-0   Monocryl subcuticular suture.  Patient tolerated the procedure well; was   taken to recovery room in stable condition.    ESTIMATED BLOOD LOSS:  Minimal.    SPECIMENS TO PATHOLOGY:  None.    CONDITION TO PAR:  Stable.       ____________________________________     CORKY BERGER MD        DD: 9/8/2018  11:27 PM

## 2018-10-02 ENCOUNTER — APPOINTMENT (OUTPATIENT)
Dept: ADMISSIONS | Facility: MEDICAL CENTER | Age: 43
End: 2018-10-02
Attending: SPECIALIST
Payer: COMMERCIAL

## 2018-10-04 ENCOUNTER — HOSPITAL ENCOUNTER (OUTPATIENT)
Facility: MEDICAL CENTER | Age: 43
End: 2018-10-04
Attending: SPECIALIST | Admitting: SPECIALIST
Payer: COMMERCIAL

## 2018-10-04 VITALS
RESPIRATION RATE: 16 BRPM | TEMPERATURE: 97.6 F | HEIGHT: 72 IN | OXYGEN SATURATION: 94 % | WEIGHT: 305.56 LBS | BODY MASS INDEX: 41.39 KG/M2 | HEART RATE: 101 BPM

## 2018-10-04 DIAGNOSIS — G89.18 ACUTE POSTOPERATIVE PAIN: ICD-10-CM

## 2018-10-04 DIAGNOSIS — M95.0 NASAL VALVE COLLAPSE: ICD-10-CM

## 2018-10-04 DIAGNOSIS — J34.3 NASAL TURBINATE HYPERTROPHY: ICD-10-CM

## 2018-10-04 DIAGNOSIS — J34.2 DEVIATED NASAL SEPTUM: ICD-10-CM

## 2018-10-04 PROCEDURE — 700111 HCHG RX REV CODE 636 W/ 250 OVERRIDE (IP): Performed by: ANESTHESIOLOGY

## 2018-10-04 PROCEDURE — 501838 HCHG SUTURE GENERAL: Performed by: SPECIALIST

## 2018-10-04 PROCEDURE — 160035 HCHG PACU - 1ST 60 MINS PHASE I: Performed by: SPECIALIST

## 2018-10-04 PROCEDURE — 700111 HCHG RX REV CODE 636 W/ 250 OVERRIDE (IP)

## 2018-10-04 PROCEDURE — A9270 NON-COVERED ITEM OR SERVICE: HCPCS

## 2018-10-04 PROCEDURE — 501409 HCHG SPLINT, REUTER BI-VALVE NASAL: Performed by: SPECIALIST

## 2018-10-04 PROCEDURE — 700102 HCHG RX REV CODE 250 W/ 637 OVERRIDE(OP): Performed by: ANESTHESIOLOGY

## 2018-10-04 PROCEDURE — 160002 HCHG RECOVERY MINUTES (STAT): Performed by: SPECIALIST

## 2018-10-04 PROCEDURE — 700101 HCHG RX REV CODE 250

## 2018-10-04 PROCEDURE — 500331 HCHG COTTONOID, SURG PATTIE: Performed by: SPECIALIST

## 2018-10-04 PROCEDURE — 700102 HCHG RX REV CODE 250 W/ 637 OVERRIDE(OP)

## 2018-10-04 PROCEDURE — A9270 NON-COVERED ITEM OR SERVICE: HCPCS | Performed by: ANESTHESIOLOGY

## 2018-10-04 PROCEDURE — 160029 HCHG SURGERY MINUTES - 1ST 30 MINS LEVEL 4: Performed by: SPECIALIST

## 2018-10-04 PROCEDURE — 160048 HCHG OR STATISTICAL LEVEL 1-5: Performed by: SPECIALIST

## 2018-10-04 PROCEDURE — 160009 HCHG ANES TIME/MIN: Performed by: SPECIALIST

## 2018-10-04 PROCEDURE — 502573 HCHG PACK, ENT: Performed by: SPECIALIST

## 2018-10-04 PROCEDURE — 110454 HCHG SHELL REV 250: Performed by: SPECIALIST

## 2018-10-04 PROCEDURE — 160041 HCHG SURGERY MINUTES - EA ADDL 1 MIN LEVEL 4: Performed by: SPECIALIST

## 2018-10-04 PROCEDURE — 160036 HCHG PACU - EA ADDL 30 MINS PHASE I: Performed by: SPECIALIST

## 2018-10-04 RX ORDER — SODIUM CHLORIDE 9 MG/ML
INJECTION, SOLUTION INTRAVENOUS CONTINUOUS
Status: DISCONTINUED | OUTPATIENT
Start: 2018-10-04 | End: 2018-10-04 | Stop reason: HOSPADM

## 2018-10-04 RX ORDER — SODIUM CHLORIDE, SODIUM LACTATE, POTASSIUM CHLORIDE, CALCIUM CHLORIDE 600; 310; 30; 20 MG/100ML; MG/100ML; MG/100ML; MG/100ML
INJECTION, SOLUTION INTRAVENOUS CONTINUOUS
Status: DISCONTINUED | OUTPATIENT
Start: 2018-10-04 | End: 2018-10-04

## 2018-10-04 RX ORDER — HYDROCODONE BITARTRATE AND ACETAMINOPHEN 7.5; 325 MG/1; MG/1
1 TABLET ORAL EVERY 6 HOURS PRN
Qty: 20 TAB | Refills: 0 | Status: SHIPPED | OUTPATIENT
Start: 2018-10-04 | End: 2018-10-09

## 2018-10-04 RX ORDER — HALOPERIDOL 5 MG/ML
1 INJECTION INTRAMUSCULAR
Status: DISCONTINUED | OUTPATIENT
Start: 2018-10-04 | End: 2018-10-04 | Stop reason: HOSPADM

## 2018-10-04 RX ORDER — LIDOCAINE HYDROCHLORIDE AND EPINEPHRINE 10; 10 MG/ML; UG/ML
INJECTION, SOLUTION INFILTRATION; PERINEURAL
Status: DISCONTINUED | OUTPATIENT
Start: 2018-10-04 | End: 2018-10-04 | Stop reason: HOSPADM

## 2018-10-04 RX ORDER — OXYCODONE HCL 5 MG/5 ML
10 SOLUTION, ORAL ORAL
Status: COMPLETED | OUTPATIENT
Start: 2018-10-04 | End: 2018-10-04

## 2018-10-04 RX ORDER — OXYMETAZOLINE HYDROCHLORIDE 0.05 G/100ML
SPRAY NASAL
Status: COMPLETED
Start: 2018-10-04 | End: 2018-10-04

## 2018-10-04 RX ORDER — CEFDINIR 300 MG/1
300 CAPSULE ORAL 2 TIMES DAILY
Qty: 10 CAP | Refills: 0 | Status: SHIPPED | OUTPATIENT
Start: 2018-10-04 | End: 2019-02-28

## 2018-10-04 RX ORDER — HYDROMORPHONE HYDROCHLORIDE 2 MG/ML
0.2 INJECTION, SOLUTION INTRAMUSCULAR; INTRAVENOUS; SUBCUTANEOUS
Status: DISCONTINUED | OUTPATIENT
Start: 2018-10-04 | End: 2018-10-04 | Stop reason: HOSPADM

## 2018-10-04 RX ORDER — OXYCODONE HYDROCHLORIDE 5 MG/1
10 TABLET ORAL
Status: DISCONTINUED | OUTPATIENT
Start: 2018-10-04 | End: 2018-10-04 | Stop reason: HOSPADM

## 2018-10-04 RX ORDER — BACITRACIN ZINC 500 [USP'U]/G
OINTMENT TOPICAL
Status: DISCONTINUED
Start: 2018-10-04 | End: 2018-10-04 | Stop reason: HOSPADM

## 2018-10-04 RX ORDER — BACITRACIN ZINC 500 [USP'U]/G
OINTMENT TOPICAL
Status: DISCONTINUED | OUTPATIENT
Start: 2018-10-04 | End: 2018-10-04 | Stop reason: HOSPADM

## 2018-10-04 RX ORDER — LIDOCAINE HYDROCHLORIDE AND EPINEPHRINE 10; 10 MG/ML; UG/ML
INJECTION, SOLUTION INFILTRATION; PERINEURAL
Status: DISCONTINUED
Start: 2018-10-04 | End: 2018-10-04 | Stop reason: HOSPADM

## 2018-10-04 RX ORDER — ONDANSETRON 2 MG/ML
4 INJECTION INTRAMUSCULAR; INTRAVENOUS
Status: DISCONTINUED | OUTPATIENT
Start: 2018-10-04 | End: 2018-10-04 | Stop reason: HOSPADM

## 2018-10-04 RX ORDER — OXYCODONE HCL 5 MG/5 ML
5 SOLUTION, ORAL ORAL
Status: COMPLETED | OUTPATIENT
Start: 2018-10-04 | End: 2018-10-04

## 2018-10-04 RX ORDER — OXYCODONE HYDROCHLORIDE 5 MG/1
5 TABLET ORAL
Status: DISCONTINUED | OUTPATIENT
Start: 2018-10-04 | End: 2018-10-04 | Stop reason: HOSPADM

## 2018-10-04 RX ORDER — HYDROMORPHONE HYDROCHLORIDE 2 MG/ML
0.1 INJECTION, SOLUTION INTRAMUSCULAR; INTRAVENOUS; SUBCUTANEOUS
Status: DISCONTINUED | OUTPATIENT
Start: 2018-10-04 | End: 2018-10-04 | Stop reason: HOSPADM

## 2018-10-04 RX ORDER — HYDROMORPHONE HYDROCHLORIDE 2 MG/ML
0.4 INJECTION, SOLUTION INTRAMUSCULAR; INTRAVENOUS; SUBCUTANEOUS
Status: DISCONTINUED | OUTPATIENT
Start: 2018-10-04 | End: 2018-10-04 | Stop reason: HOSPADM

## 2018-10-04 RX ADMIN — FENTANYL CITRATE 50 MCG: 50 INJECTION, SOLUTION INTRAMUSCULAR; INTRAVENOUS at 13:09

## 2018-10-04 RX ADMIN — OXYCODONE HYDROCHLORIDE 10 MG: 5 SOLUTION ORAL at 13:11

## 2018-10-04 RX ADMIN — OXYMETAZOLINE HYDROCHLORIDE 2 SPRAY: 5 SPRAY NASAL at 08:36

## 2018-10-04 RX ADMIN — SODIUM CHLORIDE, SODIUM LACTATE, POTASSIUM CHLORIDE, CALCIUM CHLORIDE: 600; 310; 30; 20 INJECTION, SOLUTION INTRAVENOUS at 08:33

## 2018-10-04 ASSESSMENT — PAIN SCALES - GENERAL
PAINLEVEL_OUTOF10: ASSUMED PAIN PRESENT
PAINLEVEL_OUTOF10: 0

## 2018-10-04 NOTE — OR NURSING
1239  RECEIVED PATIENT FROM OR.  REPORT FROM DR. REN.  ORAL AIRWAY IN PLACE.  RESPIRATIONS ARE EVEN AND UNLABORED.  STERI STRIPS TO NOSE ARE CDI.  BAND AID TO RIGHT EAR WITH SMALL AMOUNT OF BLOODY DRAINAGE NOTED.  GAUZE TO NOSE IS CDI.    1252  ORAL AIRWAY DC'D WITHOUT DIFFICULTY.    1309  MEDICATED WITH IV FENTANYL FOR C/O NOSE PAIN.  UNABLE TO GIVE ME A NUMBER.    1311  MEDICATED WITH PO OXYCODONE.    1339  UP TO THE BATHROOM.    1422  DISCHARGED.  DISCHARGE INSTRUCTIONS GIVEN TO PATIENT.  A VERBAL UNDERSTANDING OF ALL INSTRUCTIONS WAS STATED.  PATIENT TAKING PO, VOIDING AND AMBULATING WITHOUT DIFFICULTY.  PATIENT STATES HE IS READY TO GO HOME.  DRIP PAD AND NASAL SLING IN PLACE.  SMALL AMOUNT OF BLOODY DRAINAGE TO DRIP PAD.

## 2018-10-04 NOTE — OR SURGEON
Immediate Post OP Note    PreOp Diagnosis: nasal valve stenosis, NSD, turbinate hypertrophy    PostOp Diagnosis: same    Procedure(s):  T CONCHAL CARTILAGE GRAFT to nose- Wound Class: Clean Contaminated;  Right SMR turbinae  NASAL RECONSTRUCTION- FOR REPAIR NASAL VESTIBULAR STENOSIS WITH  GRAFT - Wound Class: Clean Contaminated    Surgeon(s):  Kush Collazo M.D.    Anesthesiologist/Type of Anesthesia:  Anesthesiologist: Jose De La Cruz M.D./General    Surgical Staff:  Circulator: Iris Carty R.N.  Relief Circulator: Kenneth Feldman R.N.  Scrub Person: Beverley Molina    Specimens removed if any:  * No specimens in log *    Estimated Blood Loss:  30ml    Findings: right  graft, batten grafts    Complications: none        10/4/2018 12:48 PM Kush Collazo M.D.

## 2018-10-04 NOTE — DISCHARGE INSTRUCTIONS
ACTIVITY: Rest and take it easy for the first 24 hours.  A responsible adult is recommended to remain with you during that time.  It is normal to feel sleepy.  We encourage you to not do anything that requires balance, judgment or coordination.    MILD FLU-LIKE SYMPTOMS ARE NORMAL. YOU MAY EXPERIENCE GENERALIZED MUSCLE ACHES, THROAT IRRITATION, HEADACHE AND/OR SOME NAUSEA.    FOR 24 HOURS DO NOT:  Drive, operate machinery or run household appliances.  Drink beer or alcoholic beverages.   Make important decisions or sign legal documents.    SPECIAL INSTRUCTIONS: *SEE NASAL INSTRUCTION SHEET**    DIET: To avoid nausea, slowly advance diet as tolerated, avoiding spicy or greasy foods for the first day.  Add more substantial food to your diet according to your physician's instructions.  Babies can be fed formula or breast milk as soon as they are hungry.  INCREASE FLUIDS AND FIBER TO AVOID CONSTIPATION.    SURGICAL DRESSING/BATHING: *MAY SHOWER TOMORROW.  AVOID HOT, STEAMY SHOWERS AND BATHS *    FOLLOW-UP APPOINTMENT:  A follow-up appointment should be arranged with your doctor in *FOLLOW UP WITH DR. NGUYEN**; call to schedule.    You should CALL YOUR PHYSICIAN if you develop:  Fever greater than 101 degrees F.  Pain not relieved by medication, or persistent nausea or vomiting.  Excessive bleeding (blood soaking through dressing) or unexpected drainage from the wound.  Extreme redness or swelling around the incision site, drainage of pus or foul smelling drainage.  Inability to urinate or empty your bladder within 8 hours.  Problems with breathing or chest pain.    You should call 911 if you develop problems with breathing or chest pain.  If you are unable to contact your doctor or surgical center, you should go to the nearest emergency room or urgent care center.  Physician's telephone #: **DR. NGUYEN [phone]*    If any questions arise, call your doctor.  If your doctor is not available, please feel free to call  the Surgical Center at [phone].  The Center is open Monday through Friday from 7AM to 7PM.  You can also call the HEALTH HOTLINE open 24 hours/day, 7 days/week and speak to a nurse at [phone], or toll free at [phone].    A registered nurse may call you a few days after your surgery to see how you are doing after your procedure.    MEDICATIONS: Resume taking daily medication.  Take prescribed pain medication with food.  If no medication is prescribed, you may take non-aspirin pain medication if needed.  PAIN MEDICATION CAN BE VERY CONSTIPATING.  Take a stool softener or laxative such as senokot, pericolace, or milk of magnesia if needed.    Prescription given for *NORCO AND OMNICEF*.  Last pain medication given at *1:11 PM**.    If your physician has prescribed pain medication that includes Acetaminophen (Tylenol), do not take additional Acetaminophen (Tylenol) while taking the prescribed medication.    Depression / Suicide Risk    As you are discharged from this [facility] facility, it is important to learn how to keep safe from harming yourself.    Recognize the warning signs:  · Abrupt changes in personality, positive or negative- including increase in energy   · Giving away possessions  · Change in eating patterns- significant weight changes-  positive or negative  · Change in sleeping patterns- unable to sleep or sleeping all the time   · Unwillingness or inability to communicate  · Depression  · Unusual sadness, discouragement and loneliness  · Talk of wanting to die  · Neglect of personal appearance   · Rebelliousness- reckless behavior  · Withdrawal from people/activities they love  · Confusion- inability to concentrate     If you or a loved one observes any of these behaviors or has concerns about self-harm, here's what you can do:  · Talk about it- your feelings and reasons for harming yourself  · Remove any means that you might use to hurt yourself (examples: pills, rope, extension  cords, firearm)  · Get professional help from the community (Mental Health, Substance Abuse, psychological counseling)  · Do not be alone:Call your Safe Contact- someone whom you trust who will be there for you.  · Call your local CRISIS HOTLINE 199-3911 or 783-238-2293  · Call your local Children's Mobile Crisis Response Team [place] (312) 483-2624 or www.[employer]  · Call the toll free National Suicide Prevention Hotlines   · National Suicide Prevention Lifeline 533-741-FMMS (6465)  · National Hope Line Network 800-SUICIDE (340-7892)

## 2018-10-05 NOTE — OP REPORT
DATE OF SERVICE:  10/04/2018    Date of procedure:      SURGEON:  Kush Collazo MD    ASSISTANT:  None.    ANESTHESIA:  General given per endotracheal tube.    ANESTHESIOLOGIST:  Dr. De La Cruz.    PREOPERATIVE DIAGNOSES:  Nasal valvular stenosis, turbinate hypertrophy, and   nasal septal deformity.    POSTOPERATIVE DIAGNOSES:  Nasal valvular stenosis, turbinate hypertrophy, and   nasal septal deformity.    NAME OF THE PROCEDURE:  Cartilage graft to nose; repair of nasal vestibular   stenosis with cartilage graft ( and batten grafts); submucous   dissection of right inferior turbinate; and revision septoplasty.    INDICATIONS:  Patient is a 43-year-old man with a history of chronic nasal   obstruction.  He has undergone previous nasal septal surgery and turbinate   reduction anteriorly.  Persistent nasal obstruction complaints have been noted   and the patient is noted to have some nasal vestibular stenosis on the right   side in relation to a persistent septal deflection to the right superiorly in   the region of the nasal valve, as well as a weak nasal tip with nasal valvular   collapse noted bilaterally.  Dusty's test is positive bilaterally.  Despite   prior surgery, there is a persistent hypertrophy of the right inferior   turbinate.  He has been refractory to medical treatment.  Breathe Right nasal   strips have helped in the past.  He presents now for surgical intervention.    DESCRIPTION OF PROCEDURE:  Patient was brought to the operating room and   placed in supine position on the operating table.  General anesthesia is   induced and the patient is endotracheally intubated without difficulty.  Eyes   are protected with taping laterally.  The table was turned to 90 degrees.    Approximately 3 mL of a 1% Xylocaine with 1:100,000 units of epinephrine   solution was used to infiltrate along the conchal portion of the right pinna,   both anteriorly and posteriorly and is in anticipation of conchal  cartilage   graft harvesting.  Approximately 1 mL of local anesthetic solution was then   used to infiltrate along the external nasal dorsum in a field block pattern.     Direct infiltration along the nasal septum and nasal tip and premaxillary   area was then undertaken with approximately 2 mL of local anesthetic solution   being utilized.  Vibrissae are trimmed along the nasal vestibule.    Approximately 4 mL of 4% cocaine solution applied topically in cottonoid   pledgets inside the nose.  After allowing for vasoconstriction internally, the   cottonoid pledgets were removed.  An additional 5 mL local anesthetic   solution was used to infiltrate along the nasal septum anteriorly on both   sides as well as along the right inferior turbinate.  The left inferior   turbinate appears to be adequately reduced in size.  Cottonoid pledgets were   then repositioned in the nose.  The patient's midface and right external ear   were then prepped and the patient is draped to expose these areas for surgery.    Attention was first directed to the right pinna where a curvilinear incision   was made along the antihelical fold at the posterior aspect of the conchal   cartilage.  Incision was carried down through the skin and perichondrium.    Perichondrium was then elevated from the anterior face of the conchal   cartilage and cavum conchal area.  A #15 blade was then used to incise the   cartilage just below the antihelical fold.  A segment of the conchal cartilage   including the caval conchal area is excised for graft use.  Bleeding in the   wound is controlled with bipolar cautery.  The skin incision was then closed   using running simple stitch of 6-0 Prolene.  A sterile cotton dental roll was   used as a bolster type dressing in the conchal portion of the external ear.    Attention was then directed to the nose and cottonoid pledgets were removed.    Attention was first directed to the right inferior turbinate.  Prominence in    medial displacement is noted.  Anterior leading edge of the inferior turbinate   was first cauterized and then incised.  Mucosa and submucosa were elevated   from the medial and lateral aspects of the anterior portion of the inferior   turbinate bone.  Small portions of the inferior turbinate bone were then   resected from its anterior aspect with Yuval forceps.  The remaining   submucosal tissue was intramurally cauterized.  The remaining turbinate was   outfractured.    At this point, marginal incisions are made along the lower lateral cartilages   bilaterally.  Incisions are carried down on to the medial crura areas   bilaterally as well.  A gull wing type incision was then made with an #11   blade on the columella at the junction of the upper two-thirds and lower   third.  Dorsal skin flap was then elevated off of the columella and nasal tip   area and extended up to the nasal bone area in the midline.  Exposure of the   lower lateral and upper lateral cartilages was undertaken in this regard.    Bleeding is controlled with bipolar cautery.    At this point, the lower lateral cartilages were retracted laterally to allow   for identification of the dorsal and caudal margins of the nasal septum.    Significant calcification of the cartilage is noted along with widening with   irregularity present along the right superior aspect of the nasal dorsum   compatible with prior surgery and/or trauma.  Perichondrium was elevated off   of the septum along the right hand aspect over the widened area superiorly.  A   #15 blade is used to thin the calcified cartilage along the superior aspect   of the right hand cartilage.  This allows for thinning of the septum and   straightening along the area of the nasal valves.    At this point, a dissection was undertaken between the upper lateral cartilage   and the nasal septum from an inferior to superior direction.  A 3 mm   osteotome then used to fracture along the inferior  aspect of the nasal bones   medially on the right hand side.  A 2-3 mm wedge of the conchal cartilage   measuring approximately 12-14 mm in length was then fashioned from the   harvested conchal cartilage graft.  This wedge is then placed in the   previously aforementioned pocket on the right hand and wedged into the   osteotomy area superiorly.  Once positioned, there is noted be displacement of   the nasal septal angle area laterally allowing for widening of the nasal   septal angle in this region.  A 6-0 clear nylon suture is used to anchor the    graft into position.    At this point, the remaining conchal cartilage was divided to allow for   creation of 2 batten grafts.  Cartilages first placed over the lateral ala   ring of the lower lateral cartilage on the right hand side, overlapping onto   the upper lateral cartilage area.  Interrupted stitches of the 6-0 clear nylon   were utilized to attach to the batten cartilage graft to the lower lateral   cartilage and the upper lateral cartilage.  The remaining conchal cartilage   graft is then placed on the left hand side in a similar fashion and again   secured to the upper and lower lateral cartilages with interrupted stitches of   the 6-0 clear nylon.  Once the lateral ala wings have been stabilized and   strengthened in this fashion, 6-0 clear nylon is used in a horizontal mattress   fashion between the batten grafts medially and across the supratip area to   further suspend alar rings and open up the nasal vestibule area; this is done   with a 6-0 clear nylon suture again.    At this point, a 4-0 chromic suture is used in a running horizontal mattress   type quilting fashion to repose mucosa along the area of the septal repair   anteriorly.  Yannick bivalved standard nasal splints were placed in the nasal   cavity along the septum bilaterally and secured with a 3-0 silk suture.  Small   amount of Surgiflo was applied as a hemostatic agent along the area  of the   right internal meatus in the superior nasal vaults.    The dorsal skin flap was then repositioned anatomically and the columellar   incision was closed using interrupted stitches of 6-0 Prolene.  A 4-0 chromic   is then used to close the marginal incisions bilaterally as well as the   lateral aspects of the gull wing incision along the area of the medial crura   on either side.  An external dressing of Steri-Strips to the nasal dorsum and   tip area was then applied.  Sterile cotton dental roll coated with antibiotic   ointment was placed into the nasal vestibule areas bilaterally.  The procedure   was then terminated.  The patient was subsequently awakened from anesthesia   and extubated without difficulty.  He was taken from the operating room to the   recovery area, awake, breathing on his own in stable condition.  Blood loss   during the procedure is felt to be approximately 30-40 mL.  There were no   complications.  Sponge and needle counts were correct.       ____________________________________     MD PAULO ESCOBEDO / KATERYNA    DD:  10/04/2018 22:16:07  DT:  10/04/2018 23:58:17    D#:  [id number]  Job#:  [id number]

## 2018-11-05 ENCOUNTER — OFFICE VISIT (OUTPATIENT)
Dept: URGENT CARE | Facility: MEDICAL CENTER | Age: 43
End: 2018-11-05
Payer: COMMERCIAL

## 2018-11-05 VITALS
TEMPERATURE: 97.9 F | HEIGHT: 72 IN | HEART RATE: 84 BPM | WEIGHT: 306 LBS | BODY MASS INDEX: 41.45 KG/M2 | OXYGEN SATURATION: 97 % | SYSTOLIC BLOOD PRESSURE: 128 MMHG | DIASTOLIC BLOOD PRESSURE: 88 MMHG

## 2018-11-05 DIAGNOSIS — S05.02XA ABRASION OF LEFT CORNEA, INITIAL ENCOUNTER: ICD-10-CM

## 2018-11-05 PROCEDURE — 99214 OFFICE O/P EST MOD 30 MIN: CPT | Performed by: NURSE PRACTITIONER

## 2018-11-05 RX ORDER — POLYMYXIN B SULFATE AND TRIMETHOPRIM 1; 10000 MG/ML; [USP'U]/ML
1 SOLUTION OPHTHALMIC EVERY 4 HOURS
Qty: 10 ML | Refills: 0 | Status: SHIPPED | OUTPATIENT
Start: 2018-11-05 | End: 2018-11-12

## 2018-11-05 ASSESSMENT — ENCOUNTER SYMPTOMS
EYE REDNESS: 1
BLURRED VISION: 0
DOUBLE VISION: 0
EYE DISCHARGE: 1
EYE PAIN: 0
PHOTOPHOBIA: 0
FOREIGN BODY SENSATION: 1

## 2018-11-05 NOTE — PROGRESS NOTES
Subjective:      Jose De Jesus Jenkins is a 43 y.o. male who presents with Eye Problem (red, pain [id number])            Eye Problem    The left eye is affected. This is a new problem. The current episode started today (pt reports he was outside golfing yesterday when he felt something in his left eye. He states he was able to remove whatever it was at that time. Admits when he woke this morning, his eye felt gritty, painful to blink and constantly tearing). The problem occurs constantly. The problem has been gradually worsening. The injury mechanism was a foreign body. There is no known exposure to pink eye. He does not wear contacts. Associated symptoms include an eye discharge (clear, watery), eye redness and a foreign body sensation. Pertinent negatives include no blurred vision, double vision or photophobia. Treatments tried: OTC lubricating eye drops. The treatment provided no relief.       Review of Systems   Eyes: Positive for discharge (clear, watery) and redness. Negative for blurred vision, double vision, photophobia and pain.        FB sensation left eye   All other systems reviewed and are negative.    Past Medical History:   Diagnosis Date   • Apnea, sleep 04/2018    cpap in use   • Frequent urination    • High cholesterol 06/21/2018   • Hyperlipidemia    • Hypertension    • Seasonal allergies    • Sleep apnea    • Snoring       Past Surgical History:   Procedure Laterality Date   • TURBINOPLASTY Right 10/4/2018    Procedure: TURBINOPLASTY- CONCHAL CARTILAGE GRAFT;  Surgeon: Kush Collazo M.D.;  Location: SURGERY SAME DAY [facility];  Service: Ent   • NASAL RECONSTRUCTION Bilateral 10/4/2018    Procedure: NASAL RECONSTRUCTION- FOR REPAIR NASAL VESTIBULAR STENOSIS WITH  GRAFT;  Surgeon: Kush Collazo M.D.;  Location: SURGERY SAME DAY [facility];  Service: Ent   • VENTRAL HERNIA REPAIR N/A 8/15/2018    Procedure: VENTRAL HERNIA REPAIR- INCARCERATED WITH MESH;  Surgeon: Corky Berger M.D.;   Location: SURGERY [facility];  Service: General   • SPHENOIDECTOMY Bilateral 7/5/2018    Procedure: SPHENOIDECTOMY - ENDOSCOPIC SPHENOIDOTOMY;  Surgeon: Cesario Sim M.D.;  Location: SURGERY SAME DAY [facility];  Service: Ent   • SEPTOPLASTY Bilateral 7/5/2018    Procedure: SEPTOPLASTY;  Surgeon: Cesario Sim M.D.;  Location: SURGERY SAME DAY [facility];  Service: Ent   • ANTROSTOMY Bilateral 7/5/2018    Procedure: ANTROSTOMY - ENDOSCOPIC MAXILLARY W/TISSUE REMOVAL;  Surgeon: Cesario Sim M.D.;  Location: SURGERY SAME DAY [facility];  Service: Ent   • ETHMOIDECTOMY Right 7/5/2018    Procedure: ETHMOIDECTOMY - ENDOSCOPIC TOTAL INCLUDING BILATERAL ENDOSCOPIC FRONTAL SINUS EXPLORATION;  Surgeon: Cesario Sim M.D.;  Location: SURGERY SAME DAY [facility];  Service: Ent   • SEPTOTURBINOPLASTY  10/24/2012    Performed by Jacinto Asher M.D. at SURGERY [facility]   • NASAL RECONSTRUCTION  10/24/2012    Performed by Jacinto Asher M.D. at SURGERY [facility]   • TONSILLECTOMY AND ADENOIDECTOMY  2009    [facility]      Social History     Social History   • Marital status: Single     Spouse name: N/A   • Number of children: N/A   • Years of education: N/A     Occupational History   • Not on file.     Social History Main Topics   • Smoking status: Never Smoker   • Smokeless tobacco: Current User     Types: Chew   • Alcohol use Yes      Comment: 2 a week   • Drug use: No   • Sexual activity: Yes     Partners: Female     Other Topics Concern   • Not on file     Social History Narrative   • No narrative on file          Objective:     /88 (BP Location: Left arm, Patient Position: Sitting, BP Cuff Size: Adult)   Pulse 84   Temp 36.6 °C (97.9 °F) (Temporal)   Ht 1.829 m (6')   Wt (!) 138.8 kg (306 lb)   SpO2 97%   BMI 41.50 kg/m²      Physical Exam   Constitutional: He is oriented to person, place, and time. Vital signs are normal. He appears well-developed and well-nourished.   HENT:   Head:  Normocephalic and atraumatic.   Eyes: Pupils are equal, round, and reactive to light. EOM and lids are normal. Left conjunctiva is injected.   Slit lamp exam:       The left eye shows corneal abrasion and fluorescein uptake.       Neck: Normal range of motion.   Cardiovascular: Normal rate and regular rhythm.    Pulmonary/Chest: Effort normal.   Musculoskeletal: Normal range of motion.   Neurological: He is alert and oriented to person, place, and time.   Skin: Skin is warm and dry. Capillary refill takes less than 2 seconds.   Psychiatric: He has a normal mood and affect. His speech is normal and behavior is normal. Thought content normal.   Vitals reviewed.              Assessment/Plan:     1. Abrasion of left cornea, initial encounter  - polymixin-trimethoprim (POLYTRIM) 83260-0.1 UNIT/ML-% Solution; Place 1 Drop in left eye every 4 hours for 7 days.  Dispense: 10 mL; Refill: 0    Continue to use OTC lubricating eye drops as directed  Keep eye protected from sunlight  Cool compresses to help with pain  Strict ER precautions for worsening eye pain, colored eye drainage, disturbance in vision, or other concerning symptoms  Supportive care, differential diagnoses, and indications for immediate follow-up discussed with patient.    Pathogenesis of diagnosis discussed including typical length and natural progression.      Instructed to return to  or nearest emergency department if symptoms fail to improve, for any change in condition, further concerns, or new concerning symptoms.  Patient states understanding of the plan of care and discharge instructions.

## 2019-02-28 ENCOUNTER — SLEEP CENTER VISIT (OUTPATIENT)
Dept: SLEEP MEDICINE | Facility: MEDICAL CENTER | Age: 44
End: 2019-02-28
Payer: COMMERCIAL

## 2019-02-28 VITALS
HEIGHT: 72 IN | BODY MASS INDEX: 41.92 KG/M2 | OXYGEN SATURATION: 95 % | HEART RATE: 88 BPM | WEIGHT: 309.5 LBS | DIASTOLIC BLOOD PRESSURE: 80 MMHG | SYSTOLIC BLOOD PRESSURE: 140 MMHG

## 2019-02-28 DIAGNOSIS — J34.2 DEVIATED NASAL SEPTUM: ICD-10-CM

## 2019-02-28 DIAGNOSIS — Z78.9 NON-SMOKER: ICD-10-CM

## 2019-02-28 DIAGNOSIS — G47.33 OSA ON CPAP: ICD-10-CM

## 2019-02-28 DIAGNOSIS — E66.01 MORBID OBESITY WITH BMI OF 40.0-44.9, ADULT (HCC): ICD-10-CM

## 2019-02-28 PROCEDURE — 99214 OFFICE O/P EST MOD 30 MIN: CPT | Performed by: NURSE PRACTITIONER

## 2019-02-28 RX ORDER — HYDROCODONE BITARTRATE AND ACETAMINOPHEN 5; 325 MG/1; MG/1
TABLET ORAL
COMMUNITY
Start: 2019-02-05

## 2019-02-28 ASSESSMENT — ENCOUNTER SYMPTOMS
MUSCULOSKELETAL NEGATIVE: 1
BRUISES/BLEEDS EASILY: 0
EYE PAIN: 0
PSYCHIATRIC NEGATIVE: 1
CARDIOVASCULAR NEGATIVE: 1
RESPIRATORY NEGATIVE: 1
EYE DISCHARGE: 0
CONSTITUTIONAL NEGATIVE: 1
GASTROINTESTINAL NEGATIVE: 1
NEUROLOGICAL NEGATIVE: 1

## 2019-02-28 NOTE — PROGRESS NOTES
Chief Complaint   Patient presents with   • Apnea     Last Seen 12/22/17         HPI: This patient is a 43 y.o. male, who presents for overdue annual follow-up of obstructive sleep apnea.     He is a former patient of Dr. Sofia. Prior [facility].  His history of severe obstructive sleep apnea and is compliant with CPAP 11 cm H2O.  Compliance download over the past 30 days indicates 87 % compliance, average use of 7 hours 32 minutes per night, AHI of 0.2. Patient reports having a very difficult time tolerating his machine.  He no longer feels it is beneficial.  He is waking frequently at night.  He explored the option of dental appliance with his dentist.  Apparently this is being made for him.  He has a temporary appliance to use in the meantime.  He feels he is sleeping better with this.  He denies EDS, frequent nocturnal awakenings or a.m. headache.  His permanent appliance will be done the end of March.    He has history of chronic sinus disease, nasal congestion.  He has had 2 prior nasal surgeries.  Most recently with Dr. Sim July 2018 patient reports little subjective benefit.    BMI is 41.  He is working on weight loss with intermittent fasting and dietary changes.    Past Medical History:   Diagnosis Date   • Apnea, sleep 04/2018    cpap in use   • Frequent urination    • High cholesterol 06/21/2018   • Hyperlipidemia    • Hypertension    • Seasonal allergies    • Sleep apnea    • Snoring        Social History   Substance Use Topics   • Smoking status: Never Smoker   • Smokeless tobacco: Current User     Types: Chew   • Alcohol use Yes      Comment: 2 a week       Family History   Problem Relation Age of Onset   • Diabetes Mother         insulin-dependent diabetes   • Stroke Father    • Heart Disease Father         AICD placement   • Diabetes Daughter         Type I DM       Immunization History   Administered Date(s) Administered   • Influenza Vaccine Quad Inj (Pf) 11/06/2015   • Influenza Vaccine Quad Inj  (Preserved) 11/03/2016   • Tdap Vaccine 11/06/2015       Current medications as of today   Current Outpatient Prescriptions   Medication Sig Dispense Refill   • Triamcinolone Acetonide (NASACORT ALLERGY 24HR NA) Spray 1 Spray in nose every day.     • multivitamin (THERAGRAN) Tab Take 1 Tab by mouth every day.     • Ascorbic Acid (VITAMIN C PO) Take 1 Tab by mouth every day.     • ibuprofen (MOTRIN) 800 MG Tab Take 800 mg by mouth every 8 hours as needed.     • Loratadine (CLARITIN PO) Take 1 Tab by mouth every day.     • lisinopril-hydrochlorothiazide (PRINZIDE, ZESTORETIC) 10-12.5 MG per tablet TAKE 1 TAB BY MOUTH EVERY DAY. 30 Tab 11   • simvastatin (ZOCOR) 10 MG Tab Take 1 Tab by mouth every evening. TAKE 1 TAB BY MOUTH EVERY EVENING. 90 Tab 3   • HYDROcodone-acetaminophen (NORCO) 5-325 MG Tab per tablet      • cefdinir (OMNICEF) 300 MG Cap Take 1 Cap by mouth 2 times a day. (Patient not taking: Reported on 2/28/2019) 10 Cap 0   • PHENTERMINE HCL PO Take  by mouth. Last dose 7/23/18       No current facility-administered medications for this visit.        Allergies: Shellfish allergy; No known drug allergy; Other environmental; and Other food    Blood pressure 140/80, pulse 88, height 1.829 m (6'), weight (!) 140.4 kg (309 lb 8 oz), SpO2 95 %.      Review of Systems   Constitutional: Negative.    HENT: Negative.    Eyes: Negative for pain and discharge.   Respiratory: Negative.    Cardiovascular: Negative.    Gastrointestinal: Negative.    Musculoskeletal: Negative.    Skin: Negative.    Neurological: Negative.    Endo/Heme/Allergies: Negative for environmental allergies. Does not bruise/bleed easily.   Psychiatric/Behavioral: Negative.        Physical Exam   Constitutional: He is oriented to person, place, and time and well-developed, well-nourished, and in no distress.   HENT:   Head: Normocephalic and atraumatic.   Eyes: Pupils are equal, round, and reactive to light.   Neck: Normal range of motion. Neck  supple. No tracheal deviation present.   Cardiovascular: Normal rate, regular rhythm and normal heart sounds.    Pulmonary/Chest: Effort normal and breath sounds normal.   Musculoskeletal: Normal range of motion.   Neurological: He is alert and oriented to person, place, and time. Gait normal.   Skin: Skin is warm and dry.   Psychiatric: Mood, memory, affect and judgment normal.   Vitals reviewed.      Diagnoses/Plan:    1. BENITA on CPAP  Reviewed with him the pathophysiology of obstructive sleep apnea.  We reviewed potential cardiac risks associated with untreated sleep apnea.  I briefly reviewed treatment options including CPAP, dental appliance, ENT surgery.  He has had numerous nasal surgeries with little improvement and already completed UPPP.  He reports being intolerant to CPAP, he wants to stop therapy.  He is in the process of getting a dental appliance which will be complete at the end of March.  After this I am assuming him some adjustments will need to be made we will plan on seeing him back in 2 months at that time home sleep study can be ordered to reevaluate sleep apnea with dental appliance.  He declines in lab study due to his kids.  Home sleep study would be easier for him. he understands if apnea is still moderate to severe in nature, the recommendation is for CPAP therapy.  He is also encouraged to make lifestyle changes including weight loss, avoidance of alcohol, sleeping elevated or on his side.    2. Morbid obesity with BMI of 40.0-44.9, adult (CMS-[facility])  Patient's body mass index is 41.98 kg/m². Exercise and nutrition counseling were performed at this visit.    3. Non-smoker      4. Deviated nasal septum  Prior surgery followed by ENT      5.Vaccine  Influenza vaccine up-to-date    This dictation was created using voice recognition software. The accuracy of the dictation is limited to the abilities of the software. I expect there may be some errors of grammar and possibly content.

## 2020-06-19 ENCOUNTER — SLEEP CENTER VISIT (OUTPATIENT)
Dept: SLEEP MEDICINE | Facility: MEDICAL CENTER | Age: 45
End: 2020-06-19
Payer: COMMERCIAL

## 2020-06-19 VITALS
BODY MASS INDEX: 40.5 KG/M2 | HEART RATE: 75 BPM | HEIGHT: 72 IN | OXYGEN SATURATION: 95 % | DIASTOLIC BLOOD PRESSURE: 70 MMHG | SYSTOLIC BLOOD PRESSURE: 134 MMHG | WEIGHT: 299 LBS

## 2020-06-19 DIAGNOSIS — G47.33 OSA ON CPAP: ICD-10-CM

## 2020-06-19 DIAGNOSIS — I10 ESSENTIAL HYPERTENSION: ICD-10-CM

## 2020-06-19 PROCEDURE — 99213 OFFICE O/P EST LOW 20 MIN: CPT | Performed by: NURSE PRACTITIONER

## 2020-06-19 NOTE — PROGRESS NOTES
Chief Complaint   Patient presents with   • Follow-Up     BENITA-Last seen 02/28/2019       HPI:      Mr. Jenkins is a 46 y/o male patient who is in today for annual BENITA f/u. He is a former patient of Dr. Sofia. Prior UPPP. PMH includes non smoker, HTN, sinus disease, nasal congestion.  He has had 2 prior nasal surgeries.  Most recently with Dr. Sim July 2018 patient reports little subjective benefit.    Compliance report from 5/19/20-6/17/20 was downloaded and reviewed with the patient which showed CPAP 11 cmH2O, 77% compliance, 6 hrs 55 min use, AHI of 0.2. He is tolerating the pressure and mask well. He goes to bed at 9 pm and wakes up at 5 am.  Sleeps soundly through the night and does not typically wake up.  He denies morning headache or snoring.  He has been informed however when he occasionally takes a nap and does not have his CPAP on that he does snore.  Patient followed up with Dr. Emani DDS who created a dental appliance for the patient it months ago.  He has been utilizing the dental appliance along with his CPAP to manage his BENITA.  He has gotten pretty used to using the CPAP but may want to discontinue it as he travels for work and it is inconvenient.  He uses a portable CPAP machine when he travels for 1 week each month for work.  He denies any new health problems or medications.      ROS:  Constitutional: Denies fevers, chills, sweats, fatigue, weight loss  Eyes: Denies glasses, vision loss, pain, drainage, double vision  Ears/Nose/Mouth/Throat: Denies rhinitis, nasal congestion, ear ache, difficulty hearing, sore throat, persistent hoarseness, decayed teeth/toothache  Cardiovascular: Denies chest pain, tightness, palpitations, swelling in feet/legs, fainting, difficulty breathing when laying down  Respiratory: Denies shortness of breath, cough, sputum, wheezing, painful breathing, coughing up blood  GI: Denies heartburn, difficulty swallowing, nausea, vomiting, abdominal pain, diarrhea,  constiptation  : Denies frequent urination, painful urination  Integumentary: Denies rashes, lumps or color changes  Neurological: Denies frequent headaches, dizziness, weakness  Sleep: Denies daytime sleepiness, loud snoring, stops breathing during sleep, waking up gasping for air, insomnia, morning headaches      Past Medical History:   Diagnosis Date   • Apnea, sleep 04/2018    cpap in use   • Frequent urination    • High cholesterol 06/21/2018   • Hyperlipidemia    • Hypertension    • Seasonal allergies    • Sleep apnea    • Snoring        Past Surgical History:   Procedure Laterality Date   • TURBINOPLASTY Right 10/4/2018    Procedure: TURBINOPLASTY- CONCHAL CARTILAGE GRAFT;  Surgeon: Kush Collazo M.D.;  Location: SURGERY SAME DAY [facility];  Service: Ent   • NASAL RECONSTRUCTION Bilateral 10/4/2018    Procedure: NASAL RECONSTRUCTION- FOR REPAIR NASAL VESTIBULAR STENOSIS WITH  GRAFT;  Surgeon: Kush Collazo M.D.;  Location: SURGERY SAME DAY [facility];  Service: Ent   • VENTRAL HERNIA REPAIR N/A 8/15/2018    Procedure: VENTRAL HERNIA REPAIR- INCARCERATED WITH MESH;  Surgeon: Corky Berger M.D.;  Location: [facility];  Service: General   • SPHENOIDECTOMY Bilateral 7/5/2018    Procedure: SPHENOIDECTOMY - ENDOSCOPIC SPHENOIDOTOMY;  Surgeon: Cesario Sim M.D.;  Location: SURGERY SAME DAY [facility];  Service: Ent   • SEPTOPLASTY Bilateral 7/5/2018    Procedure: SEPTOPLASTY;  Surgeon: Cesario Sim M.D.;  Location: SURGERY SAME DAY [facility];  Service: Ent   • ANTROSTOMY Bilateral 7/5/2018    Procedure: ANTROSTOMY - ENDOSCOPIC MAXILLARY W/TISSUE REMOVAL;  Surgeon: Cesario Sim M.D.;  Location: SURGERY SAME DAY [facility];  Service: Ent   • ETHMOIDECTOMY Right 7/5/2018    Procedure: ETHMOIDECTOMY - ENDOSCOPIC TOTAL INCLUDING BILATERAL ENDOSCOPIC FRONTAL SINUS EXPLORATION;  Surgeon: Cesario Sim M.D.;  Location: SURGERY SAME DAY [facility];  Service: Ent   •  SEPTOTURBINOPLASTY  10/24/2012    Performed by Jacinto Asher M.D. at SURGERY [facility]   • NASAL RECONSTRUCTION  10/24/2012    Performed by Jacinto Asher M.D. at SURGERY [facility]   • TONSILLECTOMY AND ADENOIDECTOMY  2009    [facility]       Family History   Problem Relation Age of Onset   • Diabetes Mother         insulin-dependent diabetes   • Stroke Father    • Heart Disease Father         AICD placement   • Diabetes Daughter         Type I DM       Social History     Socioeconomic History   • Marital status: Single     Spouse name: Not on file   • Number of children: Not on file   • Years of education: Not on file   • Highest education level: Not on file   Occupational History   • Not on file   Social Needs   • Financial resource strain: Not on file   • Food insecurity     Worry: Not on file     Inability: Not on file   • Transportation needs     Medical: Not on file     Non-medical: Not on file   Tobacco Use   • Smoking status: Never Smoker   • Smokeless tobacco: Current User     Types: Chew   Substance and Sexual Activity   • Alcohol use: Yes     Comment: 2 a week   • Drug use: No   • Sexual activity: Yes     Partners: Female   Lifestyle   • Physical activity     Days per week: Not on file     Minutes per session: Not on file   • Stress: Not on file   Relationships   • Social connections     Talks on phone: Not on file     Gets together: Not on file     Attends [employer] service: Not on file     Active member of club or organization: Not on file     Attends meetings of clubs or organizations: Not on file     Relationship status: Not on file   • Intimate partner violence     Fear of current or ex partner: Not on file     Emotionally abused: Not on file     Physically abused: Not on file     Forced sexual activity: Not on file   Other Topics Concern   • Not on file   Social History Narrative   • Not on file       Allergies as of 06/19/2020 - Reviewed 06/19/2020   Allergen Reaction Noted   • Shellfish  allergy Anaphylaxis 10/03/2012   • No known drug allergy  02/08/2010   • Other environmental Runny Nose 06/21/2018   • Other food Anaphylaxis 08/13/2018        Vitals:  Vitals:    06/19/20 1004   BP: 134/70   Pulse: 75   SpO2: 95%       Current medications as of today   Current Outpatient Medications   Medication Sig Dispense Refill   • HYDROcodone-acetaminophen (NORCO) 5-325 MG Tab per tablet      • Triamcinolone Acetonide (NASACORT ALLERGY 24HR NA) Spray 1 Spray in nose every day.     • multivitamin (THERAGRAN) Tab Take 1 Tab by mouth every day.     • Ascorbic Acid (VITAMIN C PO) Take 1 Tab by mouth every day.     • Loratadine (CLARITIN PO) Take 1 Tab by mouth every day.     • lisinopril-hydrochlorothiazide (PRINZIDE, ZESTORETIC) 10-12.5 MG per tablet TAKE 1 TAB BY MOUTH EVERY DAY. 30 Tab 11   • simvastatin (ZOCOR) 10 MG Tab Take 1 Tab by mouth every evening. TAKE 1 TAB BY MOUTH EVERY EVENING. 90 Tab 3   • ibuprofen (MOTRIN) 800 MG Tab Take 800 mg by mouth every 8 hours as needed.       No current facility-administered medications for this visit.          Physical Exam:   Appearance: Well developed, well nourished, no acute distress  Eyes: PERRL, EOM intact, sclera white, conjunctiva moist  Ears: no lesions or deformities  Hearing: grossly intact  Nose: no lesions or deformities  Respiratory effort: no intercostal retractions or use of accessory muscles  Extremities: no cyanosis or edema  Abdomen: soft   Gait and Station: normal  Digits and nails: no clubbing, cyanosis, petechiae or nodes.  Cranial nerves: grossly intact  Skin: no visible rashes, lesions or ulcers noted  Orientation: Oriented to time, person and place  Mood and affect: mood and affect appropriate, normal interaction with examiner  Judgement: Intact    Assessment:  1. BENITA on CPAP  DME Mask and Supplies    Overnight Home Sleep Study   2. Essential hypertension     3. BMI 40.0-44.9, adult (HCC)           Plan  Discussed the cardiovascular and  neuropsychiatric risks of untreated BENITA; including but not limited to: HTN, DM, MI, ASCVD, CVA, CHF, traffic accidents.     1. Compliance report from 5/19/20-6/17/20 was downloaded and reviewed with the patient which showed CPAP 11 cmH2O, 77% compliance, 6 hrs 55 min use, AHI of 0.2. Continue CPAP. Patient is compliant and benefiting from CPAP therapy for management of BENITA.   2. DME order for mask (nasal or MOC) and supplies was provided today. Continue to clean mask and supplies weekly.   3. Home sleep study with dental appliance only today. Patient is considering discontinuing CPAP if dental appliance alone is effective in managing BENITA.  Advised patient to follow-up with his dentist to have dental appliance readjusted prior to home sleep study.  4. Continue to stay active.   5. Follow up with the appropriate healthcare practitioners for all other medical problems and issues.  6. F/u in 3 months.       FELICIA Valente.      This dictation was created using voice recognition software. The accuracy of the dictation is limited to the abilities of the software. I expect there may be some errors of grammar and possibly content.

## 2020-07-20 ENCOUNTER — HOME STUDY (OUTPATIENT)
Dept: SLEEP MEDICINE | Facility: MEDICAL CENTER | Age: 45
End: 2020-07-20
Attending: NURSE PRACTITIONER
Payer: COMMERCIAL

## 2020-07-20 DIAGNOSIS — G47.33 OSA ON CPAP: ICD-10-CM

## 2020-07-20 PROCEDURE — 95806 SLEEP STUDY UNATT&RESP EFFT: CPT | Performed by: FAMILY MEDICINE

## 2020-07-23 NOTE — PROCEDURES
DIAGNOSTIC HOME SLEEP TEST (HST) REPORT       PATIENT ID:  NAME:  Jose De Jesus Jenkins  MRN:               [id number]  YOB: 1975  DATE OF STUDY: 7/20/202      Impression:     This study was done with mandibular advancement device and it shows evidence of:     1.Moderate obstructive sleep apnea with  Respiratory Event Index (AUGUSTIN) of 24.3 per hour and worse in supine sleep with AUGUSTIN at 27. These findings are based on the recording time (flow evaluation time).     2. Nocturnal hypoxia: O2 Sat. alexsandra was 70%, avg O2 was 90 % and baseline O2 at 96 %. O2 sat was below 89% for 35 min of the flow evaluation time. Avg HR 58 BPM.      TECHNICAL DESCRIPTION:  ResMed Device used was a type-III home study device. Home sleep study recording included: Airflow recording by nasal pressure transducer; Respiratory Effort by abdominal Respiratory Bands; O2 by finger oximetry. A position sensor and a snore channel was also used.    Scoring Criteria: A modification of the the AASM Manual for the Scoring of Sleep and Associated Events. Obstructive apnea was scored by cessation of airflow for at least 10 seconds with continuing respiratory effort.  Central apnea was scored by cessation of airflow for at least 10 seconds with no effort.  Hypopnea was scored by a 30% or more reduction in airflow for at least 10 seconds accompanied by an arterial oxygen desaturation of 3% or more.  (For Medicare patients, hypopneas were scored by a 30% or more reduction in airflow for at least 10 seconds accompanied by an arterial oxygen saturation of 4% or more, as required by their insurance, CMS.        General sleep summary: . Total recording time is 317 minutes and total flow evaluation time is 316 minutes.     Respiratory events:    Apneas: 4 (Obstructive apnea index 0.8/hr, Central apnea index 0 /hr, mixed 0 /hour)  Hypopneas: 124    Recommendations:    1. Consider further titration of the mandibular advancement device or  continue using CPAP at the current pressures.   2.   In general patients with sleep apnea are advised to avoid alcohol and sedatives and to not operate a motor vehicle while drowsy. In some cases alternative treatment options may prove effective in resolving sleep apnea in these options include upper airway surgery, the use of a dental orthotic or weight loss and positional therapy. Clinical correlation is required.         Leela Edge MD

## 2020-08-19 ENCOUNTER — SLEEP CENTER VISIT (OUTPATIENT)
Dept: SLEEP MEDICINE | Facility: MEDICAL CENTER | Age: 45
End: 2020-08-19
Payer: COMMERCIAL

## 2020-08-19 VITALS
BODY MASS INDEX: 40.29 KG/M2 | DIASTOLIC BLOOD PRESSURE: 74 MMHG | OXYGEN SATURATION: 95 % | HEART RATE: 67 BPM | SYSTOLIC BLOOD PRESSURE: 132 MMHG | WEIGHT: 304 LBS | HEIGHT: 73 IN

## 2020-08-19 DIAGNOSIS — G47.33 OSA ON CPAP: ICD-10-CM

## 2020-08-19 DIAGNOSIS — I10 ESSENTIAL HYPERTENSION: ICD-10-CM

## 2020-08-19 DIAGNOSIS — K21.9 GASTROESOPHAGEAL REFLUX DISEASE WITHOUT ESOPHAGITIS: ICD-10-CM

## 2020-08-19 PROCEDURE — 99213 OFFICE O/P EST LOW 20 MIN: CPT | Performed by: NURSE PRACTITIONER

## 2020-08-19 NOTE — LETTER
ZACARIAS Valente  [facility]   [address]   [place] [address]  Phone: [phone] - Fax: [phone]           Encounter Date: 8/19/2020  Provider: ZACARIAS Valente  Location of Care: [facility] SLE[facility] MEDICINE  [id number]      Patient:   Jose De Jesus Jenkins   MR Number: [id number]   YOB: 1975     PROGRESS NOTE:  Chief Complaint   Patient presents with   • Follow-Up     BENITA-Last seen 06/19/2020   • Results     Home Sleep Study 07/20/2020       HPI:      Mr. Jenkins is a 46 y/o male patient who is in today for annual BENITA f/u and sleep study results. He is a former patient of Dr. Sofia and prior records are not available for review. Prior UPPP. He has had 2 prior nasal surgeries with Dr. Asher which were not effective.  Most recently with Dr. Sim in July 2018 patient reports little subjective benefit. PMH includes deviated nasal septum, pure hypercholesterolemia, BENITA dx in 2010, morbid obesity, prediabetes, hypertension, chronic allergic rhinitis, GERD, non-smoker.    HSS with mandibular advancement device in place from 7/20/20 indicated moderate obstructive sleep apnea with  Respiratory Event Index (AUGUSTIN) of 24.3 per hour and worse in supine sleep with AUGUSTIN at 27. Apneas: 4 (Obstructive apnea index 0.8/hr, Central apnea index 0 /hr, mixed 0 /hour),  Hypopneas: 124. Nocturnal hypoxia: O2 Sat. alexsandra was 70%, avg O2 was 90 % and baseline O2 at 96 %. O2 sat was below 89% for 35 min of the flow evaluation time. Avg HR 58 BPM.     Patient did not bring his SD card to the office visit today.  Compliance report  from 5/19/20-6/17/20 showed CPAP 11 cmH2O, 77% compliance, 6 hrs 55 min use, AHI of 0.2. He uses a portable CPAP machine when he travels for 1 week each month for work. He is tolerating the pressure and mask well. He goes to bed at 9 pm and wakes up at 5 am.  Sleeps soundly through the night and does not typically wake up.  He  denies morning headache or snoring.  He has been informed however when he occasionally takes a nap and does not have his CPAP on that he snores.  Patient followed up with Dr. Rick DDS who created a dental appliance for the patient over a year ago. He has been utilizing the dental appliance along with his CPAP to manage his BENITA. He uses a portable CPAP machine when he travels for 1 week each month for work and was hoping to not have to use the CPAP.  He does report that he probably should use the CPAP more than he does but it is uncomfortable at times to fall asleep with.  As far as dental appliance he feels it is pretty tight and does not feel that any further adjustments are possible at this time.  He continues to follow-up with Dr. Roman ENT, who per patient may have additional surgical options available for him to help manage his apnea. He denies any new health problems or medications.      ROS:    Constitutional: Denies fevers, Denies weight changes  Eyes: Denies changes in vision, no eye pain  Ears/Nose/Throat/Mouth: Denies nasal congestion or sore throat   Cardiovascular: Denies chest pain or palpitations   Respiratory: Denies shortness of breath , Denies cough  Gastrointestinal/Hepatic: Denies abdominal pain, nausea, vomiting,   Skin/Breast: Denies rash,   Neurological: Denies headache, confusion,   Psychiatric: denies mood disorder   Sleep: denies snoring       Past Medical History:   Diagnosis Date   • Apnea, sleep 04/2018    cpap in use   • Frequent urination    • High cholesterol 06/21/2018   • Hyperlipidemia    • Hypertension    • Seasonal allergies    • Sleep apnea    • Snoring        Past Surgical History:   Procedure Laterality Date   • TURBINOPLASTY Right 10/4/2018    Procedure: TURBINOPLASTY- CONCHAL CARTILAGE GRAFT;  Surgeon: Kush Collazo M.D.;  Location: SURGERY SAME DAY [facility];  Service: Ent   • NASAL RECONSTRUCTION Bilateral 10/4/2018    Procedure: NASAL RECONSTRUCTION- FOR  REPAIR NASAL VESTIBULAR STENOSIS WITH  GRAFT;  Surgeon: Kush Collazo M.D.;  Location: SURGERY SAME DAY [facility];  Service: Ent   • VENTRAL HERNIA REPAIR N/A 8/15/2018    Procedure: VENTRAL HERNIA REPAIR- INCARCERATED WITH MESH;  Surgeon: Corky Berger M.D.;  Location: SURGERY [facility];  Service: General   • SPHENOIDECTOMY Bilateral 7/5/2018    Procedure: SPHENOIDECTOMY - ENDOSCOPIC SPHENOIDOTOMY;  Surgeon: Cseario Sim M.D.;  Location: SURGERY SAME DAY [facility];  Service: Ent   • SEPTOPLASTY Bilateral 7/5/2018    Procedure: SEPTOPLASTY;  Surgeon: Cesario Sim M.D.;  Location: SURGERY SAME DAY [facility];  Service: Ent   • ANTROSTOMY Bilateral 7/5/2018    Procedure: ANTROSTOMY - ENDOSCOPIC MAXILLARY W/TISSUE REMOVAL;  Surgeon: Cesario Sim M.D.;  Location: SURGERY SAME DAY [facility];  Service: Ent   • ETHMOIDECTOMY Right 7/5/2018    Procedure: ETHMOIDECTOMY - ENDOSCOPIC TOTAL INCLUDING BILATERAL ENDOSCOPIC FRONTAL SINUS EXPLORATION;  Surgeon: Cesario Sim M.D.;  Location: SURGERY SAME DAY [facility];  Service: Ent   • SEPTOTURBINOPLASTY  10/24/2012    Performed by Jacinto Asher M.D. at SURGERY [facility]   • NASAL RECONSTRUCTION  10/24/2012    Performed by Jacinto Asher M.D. at SURGERY [facility]   • TONSILLECTOMY AND ADENOIDECTOMY  2009    [facility]       Family History   Problem Relation Age of Onset   • Diabetes Mother         insulin-dependent diabetes   • Stroke Father    • Heart Disease Father         AICD placement   • Diabetes Daughter         Type I DM       Social History     Socioeconomic History   • Marital status: Single     Spouse name: Not on file   • Number of children: Not on file   • Years of education: Not on file   • Highest education level: Not on file   Occupational History   • Not on file   Social Needs   • Financial resource strain: Not on file   • Food insecurity     Worry: Not on file     Inability: Not on file   • Transportation needs      Medical: Not on file     Non-medical: Not on file   Tobacco Use   • Smoking status: Never Smoker   • Smokeless tobacco: Current User     Types: Chew   Substance and Sexual Activity   • Alcohol use: Yes     Comment: 2 a week   • Drug use: No   • Sexual activity: Yes     Partners: Female   Lifestyle   • Physical activity     Days per week: Not on file     Minutes per session: Not on file   • Stress: Not on file   Relationships   • Social connections     Talks on phone: Not on file     Gets together: Not on file     Attends Jehovah's witness service: Not on file     Active member of club or organization: Not on file     Attends meetings of clubs or organizations: Not on file     Relationship status: Not on file   • Intimate partner violence     Fear of current or ex partner: Not on file     Emotionally abused: Not on file     Physically abused: Not on file     Forced sexual activity: Not on file   Other Topics Concern   • Not on file   Social History Narrative   • Not on file       Allergies as of 08/19/2020 - Reviewed 08/19/2020   Allergen Reaction Noted   • Shellfish allergy Anaphylaxis 10/03/2012   • No known drug allergy  02/08/2010   • Other environmental Runny Nose 06/21/2018   • Other food Anaphylaxis 08/13/2018        Vitals:  Vitals:    08/19/20 1054   BP: 132/74   Pulse: 67   SpO2: 95%       Current medications as of today   Current Outpatient Medications   Medication Sig Dispense Refill   • HYDROcodone-acetaminophen (NORCO) 5-325 MG Tab per tablet      • Triamcinolone Acetonide (NASACORT ALLERGY 24HR NA) Spray 1 Spray in nose every day.     • multivitamin (THERAGRAN) Tab Take 1 Tab by mouth every day.     • Ascorbic Acid (VITAMIN C PO) Take 1 Tab by mouth every day.     • Loratadine (CLARITIN PO) Take 1 Tab by mouth every day.     • lisinopril-hydrochlorothiazide (PRINZIDE, ZESTORETIC) 10-12.5 MG per tablet TAKE 1 TAB BY MOUTH EVERY DAY. 30 Tab 11   • simvastatin (ZOCOR) 10 MG Tab Take 1 Tab by mouth every evening.  TAKE 1 TAB BY MOUTH EVERY EVENING. 90 Tab 3   • ibuprofen (MOTRIN) 800 MG Tab Take 800 mg by mouth every 8 hours as needed.       No current facility-administered medications for this visit.          Physical Exam:   Appearance: Well developed, well nourished, no acute distress  Eyes: PERRL, EOM intact, sclera white, conjunctiva moist  Ears: no lesions or deformities  Hearing: grossly intact  Nose: no lesions or deformities  Respiratory effort: no intercostal retractions or use of accessory muscles  Extremities: no cyanosis or edema  Abdomen: soft, large  Gait and Station: normal  Digits and nails: no clubbing, cyanosis, petechiae or nodes.  Cranial nerves: grossly intact  Skin: no visible rashes, lesions or ulcers noted  Orientation: Oriented to time, person and place  Mood and affect: mood and affect appropriate, normal interaction with examiner  Judgement: Intact    Assessment:  1. BENITA on CPAP  DME Mask and Supplies   2. Essential hypertension     3. Gastroesophageal reflux disease without esophagitis     4. BMI 40.0-44.9, adult (HCC)           Plan  Discussed the cardiovascular and neuropsychiatric risks of untreated BENITA; including but not limited to: HTN, DM, MI, ASCVD, CVA, CHF, traffic accidents.     1. HSS with mandibular advancement device in place from 7/20/20 indicated moderate obstructive sleep apnea with  Respiratory Event Index (AUGUSTIN) of 24.3 per hour and worse in supine sleep with AUGUSTIN at 27. Apneas: 4 (Obstructive apnea index 0.8/hr, Central apnea index 0 /hr, mixed 0 /hour),  Hypopneas: 124. Nocturnal hypoxia: O2 Sat. alexsandra was 70%, avg O2 was 90 % and baseline O2 at 96 %. O2 sat was below 89% for 35 min of the flow evaluation time. Avg HR 58 BPM.   Recommendation:  Consider further titration of the mandibular advancement device or continue using CPAP at the current pressures.     2. Compliance report from 5/19/20-6/17/20 showed CPAP 11 cmH2O, 77% compliance, 6 hrs 55 min use, AHI of 0.2. He uses a  portable CPAP machine when he travels for 1 week each month for work.  Encouraged the patient to use the CPAP machine each night for more than 4 hours for optimal health benefit.  Advised patient to bring his SD card from his primary CPAP and to bring his travel machine to the next office visit appointment.  3. DME order ([facility]) for mask (nasal mask or MOC) and supplies was provided today. Continue to clean mask and supplies weekly, and change supplies per insurance guidelines.    4. Continue to stay active.   5. Follow up with the appropriate healthcare practitioners for all other medical problems and issues.  6. Sleep hygiene discussed.   7.  Continue to follow-up with Dr. Roman ENT for further evaluation of possible surgical interventions.  8.  Continue to follow-up with Dr. Rick DDS for dental appliance.  At this time the patient feels no further adjustment of the dental appliance is possible as it is already significantly tight on his teeth.  9. F/u in 1 year, sooner if needed.       ZACARIAS Valente      This dictation was created using voice recognition software. The accuracy of the dictation is limited to the abilities of the software. I expect there may be some errors of grammar and possibly content.          Electronically signed by ZACARIAS Valente  on 08/19/20    Vicki Cabrera D.D.S.  [address] [facility] [place] [address]  Via Fax: [phone]     Faith Roman M.D.  [address] [facility]  [place] [address]  Via Fax: [phone]

## 2020-08-19 NOTE — PROGRESS NOTES
Chief Complaint   Patient presents with   • Follow-Up     BENITA-Last seen 06/19/2020   • Results     Home Sleep Study 07/20/2020       HPI:      Mr. Jenkins is a 46 y/o male patient who is in today for annual BENITA f/u and sleep study results. He is a former patient of Dr. Sofia and prior records are not available for review. Prior UPPP. He has had 2 prior nasal surgeries with Dr. Asher which were not effective.  Most recently with Dr. Sim in July 2018 patient reports little subjective benefit. PMH includes deviated nasal septum, pure hypercholesterolemia, BENITA dx in 2010, morbid obesity, prediabetes, hypertension, chronic allergic rhinitis, GERD, non-smoker.    HSS with mandibular advancement device in place from 7/20/20 indicated moderate obstructive sleep apnea with  Respiratory Event Index (AUGUSTIN) of 24.3 per hour and worse in supine sleep with AUGUSTIN at 27. Apneas: 4 (Obstructive apnea index 0.8/hr, Central apnea index 0 /hr, mixed 0 /hour),  Hypopneas: 124. Nocturnal hypoxia: O2 Sat. alexsandra was 70%, avg O2 was 90 % and baseline O2 at 96 %. O2 sat was below 89% for 35 min of the flow evaluation time. Avg HR 58 BPM.     Patient did not bring his SD card to the office visit today.  Compliance report  from 5/19/20-6/17/20 showed CPAP 11 cmH2O, 77% compliance, 6 hrs 55 min use, AHI of 0.2. He uses a portable CPAP machine when he travels for 1 week each month for work. He is tolerating the pressure and mask well. He goes to bed at 9 pm and wakes up at 5 am.  Sleeps soundly through the night and does not typically wake up.  He denies morning headache or snoring.  He has been informed however when he occasionally takes a nap and does not have his CPAP on that he snores.  Patient followed up with Dr. Rick DDS who created a dental appliance for the patient over a year ago. He has been utilizing the dental appliance along with his CPAP to manage his BENITA. He uses a portable CPAP machine when he travels for 1 week each  month for work and was hoping to not have to use the CPAP.  He does report that he probably should use the CPAP more than he does but it is uncomfortable at times to fall asleep with.  As far as dental appliance he feels it is pretty tight and does not feel that any further adjustments are possible at this time.  He continues to follow-up with Dr. Roman, ENT, who per patient may have additional surgical options available for him to help manage his apnea. He denies any new health problems or medications.      ROS:    Constitutional: Denies fevers, Denies weight changes  Eyes: Denies changes in vision, no eye pain  Ears/Nose/Throat/Mouth: Denies nasal congestion or sore throat   Cardiovascular: Denies chest pain or palpitations   Respiratory: Denies shortness of breath , Denies cough  Gastrointestinal/Hepatic: Denies abdominal pain, nausea, vomiting,   Skin/Breast: Denies rash,   Neurological: Denies headache, confusion,   Psychiatric: denies mood disorder   Sleep: denies snoring       Past Medical History:   Diagnosis Date   • Apnea, sleep 04/2018    cpap in use   • Frequent urination    • High cholesterol 06/21/2018   • Hyperlipidemia    • Hypertension    • Seasonal allergies    • Sleep apnea    • Snoring        Past Surgical History:   Procedure Laterality Date   • TURBINOPLASTY Right 10/4/2018    Procedure: TURBINOPLASTY- CONCHAL CARTILAGE GRAFT;  Surgeon: Kush Collazo M.D.;  Location: SURGERY SAME DAY [facility];  Service: Ent   • NASAL RECONSTRUCTION Bilateral 10/4/2018    Procedure: NASAL RECONSTRUCTION- FOR REPAIR NASAL VESTIBULAR STENOSIS WITH  GRAFT;  Surgeon: Kush Collazo M.D.;  Location: SURGERY SAME DAY [facility];  Service: Ent   • VENTRAL HERNIA REPAIR N/A 8/15/2018    Procedure: VENTRAL HERNIA REPAIR- INCARCERATED WITH MESH;  Surgeon: Corky Berger M.D.;  Location: SURGERY [facility];  Service: General   • SPHENOIDECTOMY Bilateral 7/5/2018    Procedure: SPHENOIDECTOMY -  ENDOSCOPIC SPHENOIDOTOMY;  Surgeon: Cesario Sim M.D.;  Location: SURGERY SAME DAY [facility];  Service: Ent   • SEPTOPLASTY Bilateral 7/5/2018    Procedure: SEPTOPLASTY;  Surgeon: Cesario Sim M.D.;  Location: SURGERY SAME DAY [facility];  Service: Ent   • ANTROSTOMY Bilateral 7/5/2018    Procedure: ANTROSTOMY - ENDOSCOPIC MAXILLARY W/TISSUE REMOVAL;  Surgeon: Cesario Sim M.D.;  Location: SURGERY SAME DAY [facility];  Service: Ent   • ETHMOIDECTOMY Right 7/5/2018    Procedure: ETHMOIDECTOMY - ENDOSCOPIC TOTAL INCLUDING BILATERAL ENDOSCOPIC FRONTAL SINUS EXPLORATION;  Surgeon: Cesario Sim M.D.;  Location: SURGERY SAME DAY [facility];  Service: Ent   • SEPTOTURBINOPLASTY  10/24/2012    Performed by Jacinto Asher M.D. at SURGERY [facility]   • NASAL RECONSTRUCTION  10/24/2012    Performed by Jacinto Asher M.D. at SURGERY [facility]   • TONSILLECTOMY AND ADENOIDECTOMY  2009    [facility]       Family History   Problem Relation Age of Onset   • Diabetes Mother         insulin-dependent diabetes   • Stroke Father    • Heart Disease Father         AICD placement   • Diabetes Daughter         Type I DM       Social History     Socioeconomic History   • Marital status: Single     Spouse name: Not on file   • Number of children: Not on file   • Years of education: Not on file   • Highest education level: Not on file   Occupational History   • Not on file   Social Needs   • Financial resource strain: Not on file   • Food insecurity     Worry: Not on file     Inability: Not on file   • Transportation needs     Medical: Not on file     Non-medical: Not on file   Tobacco Use   • Smoking status: Never Smoker   • Smokeless tobacco: Current User     Types: Chew   Substance and Sexual Activity   • Alcohol use: Yes     Comment: 2 a week   • Drug use: No   • Sexual activity: Yes     Partners: Female   Lifestyle   • Physical activity     Days per week: Not on file     Minutes per session: Not on file   •  Stress: Not on file   Relationships   • Social connections     Talks on phone: Not on file     Gets together: Not on file     Attends [employer] service: Not on file     Active member of club or organization: Not on file     Attends meetings of clubs or organizations: Not on file     Relationship status: Not on file   • Intimate partner violence     Fear of current or ex partner: Not on file     Emotionally abused: Not on file     Physically abused: Not on file     Forced sexual activity: Not on file   Other Topics Concern   • Not on file   Social History Narrative   • Not on file       Allergies as of 08/19/2020 - Reviewed 08/19/2020   Allergen Reaction Noted   • Shellfish allergy Anaphylaxis 10/03/2012   • No known drug allergy  02/08/2010   • Other environmental Runny Nose 06/21/2018   • Other food Anaphylaxis 08/13/2018        Vitals:  Vitals:    08/19/20 1054   BP: 132/74   Pulse: 67   SpO2: 95%       Current medications as of today   Current Outpatient Medications   Medication Sig Dispense Refill   • HYDROcodone-acetaminophen (NORCO) 5-325 MG Tab per tablet      • Triamcinolone Acetonide (NASACORT ALLERGY 24HR NA) Spray 1 Spray in nose every day.     • multivitamin (THERAGRAN) Tab Take 1 Tab by mouth every day.     • Ascorbic Acid (VITAMIN C PO) Take 1 Tab by mouth every day.     • Loratadine (CLARITIN PO) Take 1 Tab by mouth every day.     • lisinopril-hydrochlorothiazide (PRINZIDE, ZESTORETIC) 10-12.5 MG per tablet TAKE 1 TAB BY MOUTH EVERY DAY. 30 Tab 11   • simvastatin (ZOCOR) 10 MG Tab Take 1 Tab by mouth every evening. TAKE 1 TAB BY MOUTH EVERY EVENING. 90 Tab 3   • ibuprofen (MOTRIN) 800 MG Tab Take 800 mg by mouth every 8 hours as needed.       No current facility-administered medications for this visit.          Physical Exam:   Appearance: Well developed, well nourished, no acute distress  Eyes: PERRL, EOM intact, sclera white, conjunctiva moist  Ears: no lesions or deformities  Hearing: grossly  intact  Nose: no lesions or deformities  Respiratory effort: no intercostal retractions or use of accessory muscles  Extremities: no cyanosis or edema  Abdomen: soft, large  Gait and Station: normal  Digits and nails: no clubbing, cyanosis, petechiae or nodes.  Cranial nerves: grossly intact  Skin: no visible rashes, lesions or ulcers noted  Orientation: Oriented to time, person and place  Mood and affect: mood and affect appropriate, normal interaction with examiner  Judgement: Intact    Assessment:  1. BENITA on CPAP  DME Mask and Supplies   2. Essential hypertension     3. Gastroesophageal reflux disease without esophagitis     4. BMI 40.0-44.9, adult (HCC)           Plan  Discussed the cardiovascular and neuropsychiatric risks of untreated BENITA; including but not limited to: HTN, DM, MI, ASCVD, CVA, CHF, traffic accidents.     1. HSS with mandibular advancement device in place from 7/20/20 indicated moderate obstructive sleep apnea with  Respiratory Event Index (AUGUSTIN) of 24.3 per hour and worse in supine sleep with AUGUSTIN at 27. Apneas: 4 (Obstructive apnea index 0.8/hr, Central apnea index 0 /hr, mixed 0 /hour),  Hypopneas: 124. Nocturnal hypoxia: O2 Sat. alexsandra was 70%, avg O2 was 90 % and baseline O2 at 96 %. O2 sat was below 89% for 35 min of the flow evaluation time. Avg HR 58 BPM.   Recommendation:  Consider further titration of the mandibular advancement device or continue using CPAP at the current pressures.     2. Compliance report from 5/19/20-6/17/20 showed CPAP 11 cmH2O, 77% compliance, 6 hrs 55 min use, AHI of 0.2. He uses a portable CPAP machine when he travels for 1 week each month for work.  Encouraged the patient to use the CPAP machine each night for more than 4 hours for optimal health benefit.  Advised patient to bring his SD card from his primary CPAP and to bring his travel machine to the next office visit appointment.  3. DME order ([facility]) for mask (nasal mask or MOC) and supplies was provided  today. Continue to clean mask and supplies weekly, and change supplies per insurance guidelines.    4. Continue to stay active.   5. Follow up with the appropriate healthcare practitioners for all other medical problems and issues.  6. Sleep hygiene discussed.   7.  Continue to follow-up with Dr. Roman ENT for further evaluation of possible surgical interventions.  8.  Continue to follow-up with Dr. Rick DDS for dental appliance.  At this time the patient feels no further adjustment of the dental appliance is possible as it is already significantly tight on his teeth.  9. F/u in 1 year, sooner if needed.       FELICIA Valente.      This dictation was created using voice recognition software. The accuracy of the dictation is limited to the abilities of the software. I expect there may be some errors of grammar and possibly content.

## 2020-12-28 ENCOUNTER — TELEPHONE (OUTPATIENT)
Dept: SLEEP MEDICINE | Facility: MEDICAL CENTER | Age: 45
End: 2020-12-28

## 2020-12-28 DIAGNOSIS — G47.33 OSA ON CPAP: ICD-10-CM

## 2020-12-29 NOTE — TELEPHONE ENCOUNTER
Pt called and lm. He was returning Irais's call re: his replacement on his CPAP.        Called and spoke with pt. Pt believes he goes to [facility].    Spoke with Abbey. She is going to place order and notify pt when sent.

## 2020-12-29 NOTE — TELEPHONE ENCOUNTER
Pt LVM stating that he CPAP is not longer working , pt stated that his water chamber broke and he has replaced it but water Is still leaking . Pt states that he has had CPAP for 10 + years and would like a replacement .     LVM with pt to get further information and DME provider.     Last Seen 08/19/20 REGAN GRANADOS    Would we be able to order new PAP ?

## 2022-11-18 ENCOUNTER — PRE-ADMISSION TESTING (OUTPATIENT)
Dept: ADMISSIONS | Facility: MEDICAL CENTER | Age: 47
End: 2022-11-18
Attending: ORTHOPAEDIC SURGERY
Payer: COMMERCIAL

## 2022-11-18 VITALS — BODY MASS INDEX: 39.28 KG/M2 | HEIGHT: 72 IN | WEIGHT: 290 LBS

## 2022-11-18 RX ORDER — CYCLOBENZAPRINE HCL 10 MG
TABLET ORAL
COMMUNITY

## 2022-11-18 RX ORDER — PHENTERMINE HYDROCHLORIDE 37.5 MG/1
TABLET ORAL
COMMUNITY
Start: 2022-11-07

## 2022-11-18 RX ORDER — OXYCODONE HYDROCHLORIDE AND ACETAMINOPHEN 5; 325 MG/1; MG/1
TABLET ORAL
COMMUNITY
Start: 2022-11-16

## 2022-11-18 RX ORDER — ZOLPIDEM TARTRATE 10 MG/1
TABLET ORAL
COMMUNITY
Start: 2022-09-12

## 2022-11-18 RX ORDER — TERBINAFINE HYDROCHLORIDE 250 MG/1
TABLET ORAL
COMMUNITY
Start: 2022-11-08 | End: 2022-11-18

## 2022-11-18 RX ORDER — FUROSEMIDE 20 MG/1
TABLET ORAL
COMMUNITY
Start: 2022-11-04 | End: 2022-11-18

## 2022-11-18 RX ORDER — CLOTRIMAZOLE AND BETAMETHASONE DIPROPIONATE 10; .64 MG/G; MG/G
CREAM TOPICAL
COMMUNITY
Start: 2022-11-08 | End: 2022-11-18

## 2022-11-18 RX ORDER — VITAMIN B COMPLEX
3000 TABLET ORAL DAILY
COMMUNITY

## 2022-11-18 NOTE — PREPROCEDURE INSTRUCTIONS
" Reviewed \"Preparing for your procedure\" verbally and copy emailed to pt. Also was emailed\"fasting\", Pain management\", \"Patient Safety\", \"Speak-up\" handouts. Instructed to hold motrin and phentermine and not take any meds. DOS.   Instructed to bring CPAP. Will come to pre-admit for labwork; possibly with labwork from PCP also.   "

## 2022-11-22 ENCOUNTER — HOSPITAL ENCOUNTER (OUTPATIENT)
Dept: LAB | Facility: MEDICAL CENTER | Age: 47
End: 2022-11-22
Attending: FAMILY MEDICINE
Payer: COMMERCIAL

## 2022-11-22 ENCOUNTER — HOSPITAL ENCOUNTER (OUTPATIENT)
Dept: LAB | Facility: MEDICAL CENTER | Age: 47
End: 2022-11-22
Attending: FAMILY MEDICINE | Admitting: ORTHOPAEDIC SURGERY
Payer: COMMERCIAL

## 2022-11-22 ENCOUNTER — APPOINTMENT (OUTPATIENT)
Dept: ADMISSIONS | Facility: MEDICAL CENTER | Age: 47
End: 2022-11-22
Attending: ORTHOPAEDIC SURGERY
Payer: COMMERCIAL

## 2022-11-22 DIAGNOSIS — Z01.812 PRE-OPERATIVE LABORATORY EXAMINATION: ICD-10-CM

## 2022-11-22 DIAGNOSIS — Z01.810 PRE-OPERATIVE CARDIOVASCULAR EXAMINATION: ICD-10-CM

## 2022-11-22 LAB
ALBUMIN SERPL BCP-MCNC: 4.5 G/DL (ref 3.2–4.9)
ALBUMIN/GLOB SERPL: 1.3 G/DL
ALP SERPL-CCNC: 69 U/L (ref 30–99)
ALT SERPL-CCNC: 28 U/L (ref 2–50)
ANION GAP SERPL CALC-SCNC: 11 MMOL/L (ref 7–16)
APPEARANCE UR: CLEAR
AST SERPL-CCNC: 18 U/L (ref 12–45)
BASOPHILS # BLD AUTO: 0.7 % (ref 0–1.8)
BASOPHILS # BLD: 0.05 K/UL (ref 0–0.12)
BILIRUB SERPL-MCNC: 0.6 MG/DL (ref 0.1–1.5)
BILIRUB UR QL STRIP.AUTO: NEGATIVE
BUN SERPL-MCNC: 17 MG/DL (ref 8–22)
CALCIUM SERPL-MCNC: 9.9 MG/DL (ref 8.4–10.2)
CHLORIDE SERPL-SCNC: 102 MMOL/L (ref 96–112)
CHOLEST SERPL-MCNC: 192 MG/DL (ref 100–199)
CO2 SERPL-SCNC: 23 MMOL/L (ref 20–33)
COLOR UR: YELLOW
CREAT SERPL-MCNC: 0.68 MG/DL (ref 0.5–1.4)
EKG IMPRESSION: NORMAL
EOSINOPHIL # BLD AUTO: 0.23 K/UL (ref 0–0.51)
EOSINOPHIL NFR BLD: 3 % (ref 0–6.9)
ERYTHROCYTE [DISTWIDTH] IN BLOOD BY AUTOMATED COUNT: 39.2 FL (ref 35.9–50)
FASTING STATUS PATIENT QL REPORTED: NORMAL
FASTING STATUS PATIENT QL REPORTED: NORMAL
GFR SERPLBLD CREATININE-BSD FMLA CKD-EPI: 115 ML/MIN/1.73 M 2
GLOBULIN SER CALC-MCNC: 3.5 G/DL (ref 1.9–3.5)
GLUCOSE SERPL-MCNC: 109 MG/DL (ref 65–99)
GLUCOSE UR STRIP.AUTO-MCNC: NEGATIVE MG/DL
HCT VFR BLD AUTO: 46.6 % (ref 42–52)
HDLC SERPL-MCNC: 42 MG/DL
HGB BLD-MCNC: 15.3 G/DL (ref 14–18)
IMM GRANULOCYTES # BLD AUTO: 0.03 K/UL (ref 0–0.11)
IMM GRANULOCYTES NFR BLD AUTO: 0.4 % (ref 0–0.9)
KETONES UR STRIP.AUTO-MCNC: NEGATIVE MG/DL
LDLC SERPL CALC-MCNC: 124 MG/DL
LEUKOCYTE ESTERASE UR QL STRIP.AUTO: NEGATIVE
LYMPHOCYTES # BLD AUTO: 1.79 K/UL (ref 1–4.8)
LYMPHOCYTES NFR BLD: 23.6 % (ref 22–41)
MCH RBC QN AUTO: 27.5 PG (ref 27–33)
MCHC RBC AUTO-ENTMCNC: 32.8 G/DL (ref 33.7–35.3)
MCV RBC AUTO: 83.7 FL (ref 81.4–97.8)
MICRO URNS: NORMAL
MONOCYTES # BLD AUTO: 0.69 K/UL (ref 0–0.85)
MONOCYTES NFR BLD AUTO: 9.1 % (ref 0–13.4)
NEUTROPHILS # BLD AUTO: 4.8 K/UL (ref 1.82–7.42)
NEUTROPHILS NFR BLD: 63.2 % (ref 44–72)
NITRITE UR QL STRIP.AUTO: NEGATIVE
NRBC # BLD AUTO: 0 K/UL
NRBC BLD-RTO: 0 /100 WBC
PH UR STRIP.AUTO: 6 [PH] (ref 5–8)
PLATELET # BLD AUTO: 207 K/UL (ref 164–446)
PMV BLD AUTO: 11.9 FL (ref 9–12.9)
POTASSIUM SERPL-SCNC: 4 MMOL/L (ref 3.6–5.5)
PROT SERPL-MCNC: 8 G/DL (ref 6–8.2)
PROT UR QL STRIP: NEGATIVE MG/DL
RBC # BLD AUTO: 5.57 M/UL (ref 4.7–6.1)
RBC UR QL AUTO: NEGATIVE
SODIUM SERPL-SCNC: 136 MMOL/L (ref 135–145)
SP GR UR STRIP.AUTO: 1.02
T4 SERPL-MCNC: 8.5 UG/DL (ref 4–12)
TRIGL SERPL-MCNC: 131 MG/DL (ref 0–149)
TSH SERPL DL<=0.005 MIU/L-ACNC: 0.94 UIU/ML (ref 0.38–5.33)
WBC # BLD AUTO: 7.6 K/UL (ref 4.8–10.8)

## 2022-11-22 PROCEDURE — 81003 URINALYSIS AUTO W/O SCOPE: CPT

## 2022-11-22 PROCEDURE — 84443 ASSAY THYROID STIM HORMONE: CPT

## 2022-11-22 PROCEDURE — 93010 ELECTROCARDIOGRAM REPORT: CPT | Performed by: INTERNAL MEDICINE

## 2022-11-22 PROCEDURE — 84436 ASSAY OF TOTAL THYROXINE: CPT

## 2022-11-22 PROCEDURE — 84402 ASSAY OF FREE TESTOSTERONE: CPT

## 2022-11-22 PROCEDURE — 84270 ASSAY OF SEX HORMONE GLOBUL: CPT

## 2022-11-22 PROCEDURE — 85025 COMPLETE CBC W/AUTO DIFF WBC: CPT

## 2022-11-22 PROCEDURE — 36415 COLL VENOUS BLD VENIPUNCTURE: CPT

## 2022-11-22 PROCEDURE — 93005 ELECTROCARDIOGRAM TRACING: CPT

## 2022-11-22 PROCEDURE — 84403 ASSAY OF TOTAL TESTOSTERONE: CPT

## 2022-11-22 PROCEDURE — 80061 LIPID PANEL: CPT

## 2022-11-22 PROCEDURE — 80053 COMPREHEN METABOLIC PANEL: CPT

## 2022-11-23 LAB
SHBG SERPL-SCNC: 55 NMOL/L (ref 17–56)
TESTOST FREE MFR SERPL: 1.3 % (ref 1.6–2.9)
TESTOST FREE SERPL-MCNC: 41 PG/ML (ref 47–244)
TESTOST SERPL-MCNC: 314 NG/DL (ref 300–890)

## 2022-12-02 ENCOUNTER — ANESTHESIA EVENT (OUTPATIENT)
Dept: SURGERY | Facility: MEDICAL CENTER | Age: 47
End: 2022-12-02
Payer: COMMERCIAL

## 2022-12-02 ENCOUNTER — HOSPITAL ENCOUNTER (OUTPATIENT)
Facility: MEDICAL CENTER | Age: 47
End: 2022-12-02
Attending: ORTHOPAEDIC SURGERY | Admitting: ORTHOPAEDIC SURGERY
Payer: COMMERCIAL

## 2022-12-02 ENCOUNTER — ANESTHESIA (OUTPATIENT)
Dept: SURGERY | Facility: MEDICAL CENTER | Age: 47
End: 2022-12-02
Payer: COMMERCIAL

## 2022-12-02 VITALS
SYSTOLIC BLOOD PRESSURE: 155 MMHG | TEMPERATURE: 97.2 F | OXYGEN SATURATION: 92 % | DIASTOLIC BLOOD PRESSURE: 89 MMHG | RESPIRATION RATE: 17 BRPM | WEIGHT: 315 LBS | BODY MASS INDEX: 43.53 KG/M2 | HEART RATE: 66 BPM

## 2022-12-02 PROCEDURE — 160025 RECOVERY II MINUTES (STATS): Performed by: ORTHOPAEDIC SURGERY

## 2022-12-02 PROCEDURE — 160036 HCHG PACU - EA ADDL 30 MINS PHASE I: Performed by: ORTHOPAEDIC SURGERY

## 2022-12-02 PROCEDURE — 160002 HCHG RECOVERY MINUTES (STAT): Performed by: ORTHOPAEDIC SURGERY

## 2022-12-02 PROCEDURE — 700101 HCHG RX REV CODE 250: Performed by: ORTHOPAEDIC SURGERY

## 2022-12-02 PROCEDURE — 502000 HCHG MISC OR IMPLANTS RC 0278: Performed by: ORTHOPAEDIC SURGERY

## 2022-12-02 PROCEDURE — 160046 HCHG PACU - 1ST 60 MINS PHASE II: Performed by: ORTHOPAEDIC SURGERY

## 2022-12-02 PROCEDURE — 700101 HCHG RX REV CODE 250: Performed by: ANESTHESIOLOGY

## 2022-12-02 PROCEDURE — 700111 HCHG RX REV CODE 636 W/ 250 OVERRIDE (IP): Performed by: ORTHOPAEDIC SURGERY

## 2022-12-02 PROCEDURE — 160041 HCHG SURGERY MINUTES - EA ADDL 1 MIN LEVEL 4: Performed by: ORTHOPAEDIC SURGERY

## 2022-12-02 PROCEDURE — 64415 NJX AA&/STRD BRCH PLXS IMG: CPT | Mod: 59,RT | Performed by: ANESTHESIOLOGY

## 2022-12-02 PROCEDURE — 160009 HCHG ANES TIME/MIN: Performed by: ORTHOPAEDIC SURGERY

## 2022-12-02 PROCEDURE — 160029 HCHG SURGERY MINUTES - 1ST 30 MINS LEVEL 4: Performed by: ORTHOPAEDIC SURGERY

## 2022-12-02 PROCEDURE — 160035 HCHG PACU - 1ST 60 MINS PHASE I: Performed by: ORTHOPAEDIC SURGERY

## 2022-12-02 PROCEDURE — 700111 HCHG RX REV CODE 636 W/ 250 OVERRIDE (IP): Performed by: ANESTHESIOLOGY

## 2022-12-02 PROCEDURE — C1713 ANCHOR/SCREW BN/BN,TIS/BN: HCPCS | Performed by: ORTHOPAEDIC SURGERY

## 2022-12-02 PROCEDURE — 01630 ANES OPN/ARTHR PX SHO JT NOS: CPT | Performed by: ANESTHESIOLOGY

## 2022-12-02 PROCEDURE — 64415 NJX AA&/STRD BRCH PLXS IMG: CPT | Performed by: ORTHOPAEDIC SURGERY

## 2022-12-02 PROCEDURE — 76942 ECHO GUIDE FOR BIOPSY: CPT | Mod: 26 | Performed by: ANESTHESIOLOGY

## 2022-12-02 PROCEDURE — 160048 HCHG OR STATISTICAL LEVEL 1-5: Performed by: ORTHOPAEDIC SURGERY

## 2022-12-02 DEVICE — IMPLANTABLE DEVICE: Type: IMPLANTABLE DEVICE | Site: SHOULDER | Status: FUNCTIONAL

## 2022-12-02 DEVICE — ANCHOR ICONIX 1-#2 FORCEFIBER 1.4MM (5EA/BX): Type: IMPLANTABLE DEVICE | Site: SHOULDER | Status: FUNCTIONAL

## 2022-12-02 RX ORDER — ONDANSETRON 2 MG/ML
4 INJECTION INTRAMUSCULAR; INTRAVENOUS
Status: DISCONTINUED | OUTPATIENT
Start: 2022-12-02 | End: 2022-12-02 | Stop reason: HOSPADM

## 2022-12-02 RX ORDER — MIDAZOLAM HYDROCHLORIDE 1 MG/ML
INJECTION INTRAMUSCULAR; INTRAVENOUS PRN
Status: DISCONTINUED | OUTPATIENT
Start: 2022-12-02 | End: 2022-12-02 | Stop reason: SURG

## 2022-12-02 RX ORDER — BUPIVACAINE HYDROCHLORIDE AND EPINEPHRINE 5; 5 MG/ML; UG/ML
INJECTION, SOLUTION EPIDURAL; INTRACAUDAL; PERINEURAL
Status: DISCONTINUED | OUTPATIENT
Start: 2022-12-02 | End: 2022-12-02 | Stop reason: HOSPADM

## 2022-12-02 RX ORDER — MIDAZOLAM HYDROCHLORIDE 1 MG/ML
1 INJECTION INTRAMUSCULAR; INTRAVENOUS
Status: DISCONTINUED | OUTPATIENT
Start: 2022-12-02 | End: 2022-12-02 | Stop reason: HOSPADM

## 2022-12-02 RX ORDER — DEXAMETHASONE SODIUM PHOSPHATE 4 MG/ML
INJECTION, SOLUTION INTRA-ARTICULAR; INTRALESIONAL; INTRAMUSCULAR; INTRAVENOUS; SOFT TISSUE PRN
Status: DISCONTINUED | OUTPATIENT
Start: 2022-12-02 | End: 2022-12-02 | Stop reason: SURG

## 2022-12-02 RX ORDER — DIPHENHYDRAMINE HYDROCHLORIDE 50 MG/ML
INJECTION INTRAMUSCULAR; INTRAVENOUS PRN
Status: DISCONTINUED | OUTPATIENT
Start: 2022-12-02 | End: 2022-12-02 | Stop reason: SURG

## 2022-12-02 RX ORDER — CEFAZOLIN SODIUM 1 G/3ML
INJECTION, POWDER, FOR SOLUTION INTRAMUSCULAR; INTRAVENOUS
Status: DISCONTINUED | OUTPATIENT
Start: 2022-12-02 | End: 2022-12-02 | Stop reason: HOSPADM

## 2022-12-02 RX ORDER — MEPERIDINE HYDROCHLORIDE 25 MG/ML
12.5 INJECTION INTRAMUSCULAR; INTRAVENOUS; SUBCUTANEOUS
Status: DISCONTINUED | OUTPATIENT
Start: 2022-12-02 | End: 2022-12-02 | Stop reason: HOSPADM

## 2022-12-02 RX ORDER — HYDROMORPHONE HYDROCHLORIDE 1 MG/ML
0.1 INJECTION, SOLUTION INTRAMUSCULAR; INTRAVENOUS; SUBCUTANEOUS
Status: DISCONTINUED | OUTPATIENT
Start: 2022-12-02 | End: 2022-12-02 | Stop reason: HOSPADM

## 2022-12-02 RX ORDER — IPRATROPIUM BROMIDE AND ALBUTEROL SULFATE 2.5; .5 MG/3ML; MG/3ML
3 SOLUTION RESPIRATORY (INHALATION)
Status: DISCONTINUED | OUTPATIENT
Start: 2022-12-02 | End: 2022-12-02 | Stop reason: HOSPADM

## 2022-12-02 RX ORDER — HYDRALAZINE HYDROCHLORIDE 20 MG/ML
5 INJECTION INTRAMUSCULAR; INTRAVENOUS
Status: DISCONTINUED | OUTPATIENT
Start: 2022-12-02 | End: 2022-12-02 | Stop reason: HOSPADM

## 2022-12-02 RX ORDER — HYDROMORPHONE HYDROCHLORIDE 1 MG/ML
0.2 INJECTION, SOLUTION INTRAMUSCULAR; INTRAVENOUS; SUBCUTANEOUS
Status: DISCONTINUED | OUTPATIENT
Start: 2022-12-02 | End: 2022-12-02 | Stop reason: HOSPADM

## 2022-12-02 RX ORDER — SODIUM CHLORIDE, SODIUM LACTATE, POTASSIUM CHLORIDE, CALCIUM CHLORIDE 600; 310; 30; 20 MG/100ML; MG/100ML; MG/100ML; MG/100ML
INJECTION, SOLUTION INTRAVENOUS CONTINUOUS
Status: ACTIVE | OUTPATIENT
Start: 2022-12-02 | End: 2022-12-02

## 2022-12-02 RX ORDER — OXYCODONE HCL 5 MG/5 ML
5 SOLUTION, ORAL ORAL
Status: DISCONTINUED | OUTPATIENT
Start: 2022-12-02 | End: 2022-12-02 | Stop reason: HOSPADM

## 2022-12-02 RX ORDER — HALOPERIDOL 5 MG/ML
1 INJECTION INTRAMUSCULAR
Status: DISCONTINUED | OUTPATIENT
Start: 2022-12-02 | End: 2022-12-02 | Stop reason: HOSPADM

## 2022-12-02 RX ORDER — ROPIVACAINE HYDROCHLORIDE 5 MG/ML
INJECTION, SOLUTION EPIDURAL; INFILTRATION; PERINEURAL
Status: COMPLETED | OUTPATIENT
Start: 2022-12-02 | End: 2022-12-02

## 2022-12-02 RX ORDER — LABETALOL HYDROCHLORIDE 5 MG/ML
INJECTION, SOLUTION INTRAVENOUS PRN
Status: DISCONTINUED | OUTPATIENT
Start: 2022-12-02 | End: 2022-12-02 | Stop reason: SURG

## 2022-12-02 RX ORDER — OXYCODONE HCL 5 MG/5 ML
10 SOLUTION, ORAL ORAL
Status: DISCONTINUED | OUTPATIENT
Start: 2022-12-02 | End: 2022-12-02 | Stop reason: HOSPADM

## 2022-12-02 RX ORDER — LIDOCAINE HYDROCHLORIDE 20 MG/ML
INJECTION, SOLUTION EPIDURAL; INFILTRATION; INTRACAUDAL; PERINEURAL PRN
Status: DISCONTINUED | OUTPATIENT
Start: 2022-12-02 | End: 2022-12-02 | Stop reason: SURG

## 2022-12-02 RX ORDER — ONDANSETRON 2 MG/ML
INJECTION INTRAMUSCULAR; INTRAVENOUS PRN
Status: DISCONTINUED | OUTPATIENT
Start: 2022-12-02 | End: 2022-12-02 | Stop reason: HOSPADM

## 2022-12-02 RX ORDER — HYDROMORPHONE HYDROCHLORIDE 1 MG/ML
0.4 INJECTION, SOLUTION INTRAMUSCULAR; INTRAVENOUS; SUBCUTANEOUS
Status: DISCONTINUED | OUTPATIENT
Start: 2022-12-02 | End: 2022-12-02 | Stop reason: HOSPADM

## 2022-12-02 RX ORDER — SODIUM CHLORIDE, SODIUM LACTATE, POTASSIUM CHLORIDE, CALCIUM CHLORIDE 600; 310; 30; 20 MG/100ML; MG/100ML; MG/100ML; MG/100ML
INJECTION, SOLUTION INTRAVENOUS CONTINUOUS
Status: DISCONTINUED | OUTPATIENT
Start: 2022-12-02 | End: 2022-12-02 | Stop reason: HOSPADM

## 2022-12-02 RX ORDER — CEFAZOLIN SODIUM 1 G/3ML
INJECTION, POWDER, FOR SOLUTION INTRAMUSCULAR; INTRAVENOUS PRN
Status: DISCONTINUED | OUTPATIENT
Start: 2022-12-02 | End: 2022-12-02 | Stop reason: SURG

## 2022-12-02 RX ADMIN — LABETALOL HYDROCHLORIDE 10 MG: 5 INJECTION, SOLUTION INTRAVENOUS at 12:07

## 2022-12-02 RX ADMIN — ROPIVACAINE HYDROCHLORIDE 25 ML: 5 INJECTION, SOLUTION EPIDURAL; INFILTRATION; PERINEURAL at 10:37

## 2022-12-02 RX ADMIN — LIDOCAINE HYDROCHLORIDE 100 MG: 20 INJECTION, SOLUTION EPIDURAL; INFILTRATION; INTRACAUDAL at 10:43

## 2022-12-02 RX ADMIN — MIDAZOLAM HYDROCHLORIDE 2 MG: 1 INJECTION, SOLUTION INTRAMUSCULAR; INTRAVENOUS at 10:37

## 2022-12-02 RX ADMIN — DEXAMETHASONE SODIUM PHOSPHATE 8 MG: 4 INJECTION, SOLUTION INTRA-ARTICULAR; INTRALESIONAL; INTRAMUSCULAR; INTRAVENOUS; SOFT TISSUE at 10:43

## 2022-12-02 RX ADMIN — PROPOFOL 200 MG: 10 INJECTION, EMULSION INTRAVENOUS at 10:43

## 2022-12-02 RX ADMIN — DIPHENHYDRAMINE HYDROCHLORIDE 25 MG: 50 INJECTION, SOLUTION INTRAMUSCULAR; INTRAVENOUS at 11:20

## 2022-12-02 RX ADMIN — CEFAZOLIN 3 G: 330 INJECTION, POWDER, FOR SOLUTION INTRAMUSCULAR; INTRAVENOUS at 10:49

## 2022-12-02 RX ADMIN — ONDANSETRON 4 MG: 2 INJECTION INTRAMUSCULAR; INTRAVENOUS at 10:43

## 2022-12-02 RX ADMIN — FENTANYL CITRATE 100 MCG: 50 INJECTION, SOLUTION INTRAMUSCULAR; INTRAVENOUS at 11:03

## 2022-12-02 RX ADMIN — FENTANYL CITRATE 100 MCG: 50 INJECTION, SOLUTION INTRAMUSCULAR; INTRAVENOUS at 10:37

## 2022-12-02 ASSESSMENT — FIBROSIS 4 INDEX: FIB4 SCORE: 0.77

## 2022-12-02 ASSESSMENT — PAIN SCALES - GENERAL: PAIN_LEVEL: 0

## 2022-12-02 ASSESSMENT — PAIN DESCRIPTION - PAIN TYPE: TYPE: SURGICAL PAIN

## 2022-12-02 NOTE — OR SURGEON
Immediate Post OP Note    PreOp Diagnosis: RIGHT shoulder impingement syndrome, AC joint OA, SLAP lesion, RCT      PostOp Diagnosis: SAME       Procedure(s):  RIGHT SHOULDER ARTHROSCOPY, SUBACROMIAL DECOMPRESSION, ROTATOR CUFF REPAIR, DISTAL CLAVICLE EXCISION, LABRAL DEBRIDEMENT, BICEPS TENODESIS - Wound Class: Clean    Surgeon(s):  Keyana Correa M.D.    Anesthesiologist/Type of Anesthesia:  Anesthesiologist: Toni Dorsey M.D./General    Surgical Staff:  Circulator: Сергей Sharp  Scrub Person: Odin March; Bernarda Camargo  Private Scrub: ALBAN BadilloNDWIGHT    Specimens removed if any:  * No specimens in log *    Estimated Blood Loss: min    Findings: as above    Complications: none    [place]        12/2/2022 12:36 PM Keyana Correa M.D.

## 2022-12-02 NOTE — OR NURSING
Pt arrived to stage 2 via thony. Assisted with dressing and to chair. Stable on RA. States pain is tolerable, denies nausea. DC education provided regarding home care, f/u appointments and dc medications. Pt and pt GF verbalized understanding. Meets criteria for stage 2. D/Julio C to care of family post uneventful stay in PACU 2.

## 2022-12-02 NOTE — ANESTHESIA POSTPROCEDURE EVALUATION
Patient: Jose De Jesus Jenkins    Procedure Summary     Date: 12/02/22 Room / Location:  OR  / SURGERY [facility]    Anesthesia Start: 1043 Anesthesia Stop: 1244    Procedure: RIGHT SHOULDER ARTHROSCOPY, SUBACROMIAL DECOMPRESSION, ROTATOR CUFF REPAIR, DISTAL CLAVICLE EXCISION, LABRAL DEBRIDEMENT, BICEPS TENODESIS (Right: Shoulder) Diagnosis: (FULL THICKNESS ROTATOR CUFF REPAIR)    Surgeons: Keyana Correa M.D. Responsible Provider: Toni Dorsey M.D.    Anesthesia Type: general, peripheral nerve block ASA Status: 3          Final Anesthesia Type: general, peripheral nerve block  Last vitals  BP   Blood Pressure: 130/59    Temp   36.1 °C (97 °F)    Pulse   64   Resp   14    SpO2   91 %      Anesthesia Post Evaluation    Patient location during evaluation: PACU  Patient participation: complete - patient participated  Level of consciousness: awake and alert  Pain score: 0    Airway patency: patent  Anesthetic complications: no  Cardiovascular status: hemodynamically stable  Respiratory status: acceptable  Hydration status: euvolemic    PONV: none          No notable events documented.     Nurse Pain Score: 5 (NPRS)

## 2022-12-02 NOTE — OR NURSING
1238 Pt arrived from OR, report received from anesthesiologist and RN. Pt sedated at this time. OPA in place, respirations spontaneous and unlabored. Surgical dressing in place to right shoulder, CDI, ice applied. Immobilizer sling in place. Radial pulse 2+, cap refill <3 sec, finger to right hand warm to touch.    1247 OPA d/c'd     1250 Pt more awake, denies pain. Able to move right fingers.     1256 Report to Noemi BACK.     1330 Report back from WONG Franklin. Pt sleeping on [facility] at this time.     1353 Pt rates pain 5/10 and tolerable.     1359 Family updated over the phone     1408 Pt weaned to room air, still sleeping.     1415 Pt more awake, states pain 5/10 and tolerable.     1441 Report to Olga BACK, pt transferred to stage 2. All belongings on [facility] at time of transfer.

## 2022-12-02 NOTE — ANESTHESIA PREPROCEDURE EVALUATION
Case: [id number] Date/Time: 12/02/22 1115    Procedures:       RIGHT SHOULDER ARTHROSCOPY, SUBACROMIAL DECOMPRESSION, ROTATOR CUFF REPAIR, DISTAL CLAVICLE EXCISION, POSSIBLE LABRAL DEBRIDEMENT, POSSIBLE BICEPS TENODESIS, REPAIRS AS INDICATED      ARTHROSCOPY, SHOULDER, WITH ROTATOR CUFF REPAIR      EXCISION, CLAVICLE, DISTAL      ARTHROSCOPY, SHOULDER, WITH EXTENSIVE DEBRIDEMENT      ARTHROSCOPY, SHOULDER, WITH BICEPS TENODESIS    Pre-op diagnosis: FULL THICKNESS ROTATOR CUFF REPAIR    Location: [place] [id number] / SURGERY [facility]    Surgeons: Keyana Correa M.D.      Right shoulder injury.     Denies angina, dyspnea, GERD with symptoms.     NPO.     No problems with prior anesthetics.     Relevant Problems   ANESTHESIA   (positive) BENITA on CPAP      CARDIAC   (positive) Essential hypertension      GI   (positive) Gastroesophageal reflux disease without esophagitis       Physical Exam    Airway   Mallampati: II  TM distance: >3 FB  Neck ROM: full       Cardiovascular - normal exam  Rhythm: regular  Rate: normal  (-) murmur     Dental - normal exam           Pulmonary - normal exam  Breath sounds clear to auscultation     Abdominal    Neurological - normal exam                 Anesthesia Plan    ASA 3   ASA physical status 3 criteria: morbid obesity - BMI greater than or equal to 40    Plan - general and peripheral nerve block     Peripheral nerve block will be post-op pain control  Airway plan will be LMA          Induction: intravenous    Postoperative Plan: Postoperative administration of opioids is intended.    Pertinent diagnostic labs and testing reviewed    Informed Consent:    Anesthetic plan and risks discussed with patient.    Use of blood products discussed with: patient whom consented to blood products.

## 2022-12-02 NOTE — ANESTHESIA PROCEDURE NOTES
Peripheral Block    Date/Time: 12/2/2022 10:37 AM  Performed by: Toni Dorsey M.D.  Authorized by: Toni Dorsey M.D.     Patient Location:  Pre-op  Start Time:  12/2/2022 10:37 AM  End Time:  12/2/2022 10:39 AM  Reason for Block: at surgeon's request and post-op pain management ONLY    patient identified, IV checked, site marked, risks and benefits discussed, surgical consent, monitors and equipment checked, pre-op evaluation and timeout performed    Patient Position:  Supine  Prep: ChloraPrep    Monitoring:  Heart rate, continuous pulse ox and cardiac monitor  Block Region:  Upper Extremity  Upper Extremity - Block Type:  BRACHIAL PLEXUS block, Interscalene approach    Laterality:  Right  Procedures: ultrasound guided  Image captured, interpreted and electronically stored.  Local Infiltration:  Lidocaine  Strength:  1 %  Dose:  3 ml  Block Type:  Single-shot  Needle Length:  50mm  Needle Gauge:  22 G  Needle Localization:  Ultrasound guidance  Injection Assessment:  Negative aspiration for heme, no paresthesia on injection, incremental injection and local visualized surrounding nerve on ultrasound  Evidence of intravascular injection: No

## 2022-12-02 NOTE — ANESTHESIA PROCEDURE NOTES
Airway    Date/Time: 12/2/2022 10:47 AM  Performed by: Toni Dorsey M.D.  Authorized by: Toni Dorsey M.D.     Location:  OR  Urgency:  Elective  Indications for Airway Management:  Anesthesia      Spontaneous Ventilation: absent    Sedation Level:  Deep  Preoxygenated: Yes    Mask Difficulty Assessment:  0 - not attempted  Final Airway Type:  Supraglottic airway  Final Supraglottic Airway:  Standard LMA    SGA Size:  5  Number of Attempts at Approach:  1

## 2022-12-02 NOTE — OP REPORT
DATE OF SERVICE:  12/02/2022     PREOPERATIVE DIAGNOSES:  Right shoulder impingement syndrome,   acromioclavicular joint arthritis, SLAP lesion with proximal biceps   tendinopathy, partial-thickness rotator cuff tear.     POSTOPERATIVE DIAGNOSES:  Right shoulder impingement syndrome,   acromioclavicular joint arthritis, SLAP lesion with moderate proximal biceps   tendinopathy, small full-thickness rotator cuff tear.     PROCEDURES:  Right shoulder arthroscopy, subacromial decompression,   arthroscopic distal clavicle excision, labral debridement, arthroscopic   suprapectoral proximal biceps tenodesis, arthroscopic rotator cuff repair.     SURGEON:  Keyana Correa MD     ASSISTANT:  Fernando Pineda CFA     ANESTHESIOLOGIST:  Toni Dorsey MD     TYPE OF ANESTHESIA:  General, with preoperative interscalene nerve block.     INTRAVENOUS FLUID:  1 liter crystalloid.     ESTIMATED BLOOD LOSS:  Minimal.     DRAINS:  None.     SPECIMENS:  None.     COMPLICATIONS:  None.     IMPLANTS:  Lyman 1.4 mm Iconix anchors x2 for biceps tenodesis, single   triple-loaded AlphaVent anchor x 1 for rotator cuff repair.     REASON FOR PROCEDURE:  The patient is a 47-year-old male with a longstanding   history of intermittent right shoulder pain.  He failed to respond   definitively 2 nonoperative measures.  We reviewed plain x-ray as well as MRI   findings and decided to proceed with arthroscopy.     OPERATION:  The patient was given a right interscalene nerve block by the   anesthesiologist before surgery.  Once back in the operating room, a breathing   tube was placed.  He was given 3 grams of IV Ancef.  He was then placed in   the lateral decubitus position.  Care was taken to pad his axilla as well as   all bony prominences.  The right shoulder was then examined under anesthesia.    He was noted to have good range of motion, without instability.  The right   upper extremity was prepped with ChloraPrep and draped in standard sterile    fashion.  It was then placed in the Arthrex traction device.  Bony landmarks   were drawn and a standard posterior portal was established.  The arthroscope   was then inserted into the glenohumeral joint.  An anterosuperior cannula was   placed in the rotator interval, just beneath the biceps tendon.  A probe was   inserted.  There were no obvious arthritic changes noted in the glenohumeral   joint.  There was a type 2 SLAP lesion with mild proximal biceps tendinopathy   and subluxation.  The subscapularis tendon was intact.  There was what   appeared to be a partial-thickness leading edge supraspinatus tear.  The   labrum was debrided circumferentially.  The arthroscope was inserted into the   subacromial space.  A lateral portal was established.  There was a copious   amount of thickened, inflamed bursal tissue.  This was removed with the   shaver.  Borders of the acromion were defined.  A 5.5 mm resector was used to   remove the anterior curve as well as lateral edge.  Portals were switched.    Using a standard cutting block technique from posterior to anterior, a   subacromial decompression was completed.  This created a nice, smooth surface   to the undersurface of the acromion.  Attention was then turned back to the   rotator cuff.  A small full-thickness tear was noted.  This would be repaired   after the biceps tenodesis and distal clavicle excision.  The distal clavicle   was addressed next.  The previously established anterior portal was noted to   be in good, tangential position to perform a distal clavicle excision.  This   was done with the 5.5 mm resector to open up the space here.  The lateral   aspect of the acromioplasty was completed.  Next, the arthroscope was placed   into the anterosuperior cannula, aiming down the arm.  The biceps tendon was   localized and noted to be subluxated medially.  A percutaneous portal was   established and the groove was roughened for an onlay suprapectoral  proximal   biceps tenodesis.  The first of two 1.4 mm Iconix anchors was drilled and   tapped into place.  The Cobra device was used to take a cinch suture through   the biceps with the free limb brought from underneath the biceps through the   upper transverse fibers of the pectoralis.  A second anchor was drilled and   tapped into place also lateral to the biceps.  The Cobra device was used to   again take a cinch suture.  The more proximal suture was tied down first   followed by the more distal one.  This allowed for excellent onlay fixation of   the biceps tendon just proximal to the pectoralis.  The arthroscope was then   placed into the glenohumeral joint.  The biceps tendon was simply released off   of the superior glenoid tubercle.  The arthroscope was placed back into the   suprapectoral region where the biceps tendon was tenotomized proximal to the   tenodesis site with the segment then removed.  Next, the arthroscope was   placed back into the subacromial space.  The small full-thickness tear was   identified.  The greater tuberosity was roughened to create a healing response   for the rotator cuff repair and then fenestrated with the tip of the punch   tap.  A single AlphaVent anchor was placed just off the articular margin.  A   simple suture was taken anteriorly followed by a wide horizontal mattress   suture with a simple suture through the middle.  All sutures were then tied   down from posterior to anterior, allowing for excellent repair.  All   instruments were then removed.  Portals were closed with 3-0 nylon suture.  A   sterile dressing was applied.  All drapes were removed and the arm was   carefully taken out of traction and placed into a shoulder abduction sling.    The patient was placed supine on a stretcher and taken to recovery room, in   stable condition.        ______________________________  MD MYESHA PETERS/YOLY    DD:  12/02/2022 12:48  DT:  12/02/2022 13:19    Job#:   [id number]

## 2022-12-02 NOTE — DISCHARGE INSTRUCTIONS
ACTIVITY: Rest and take it easy for the first 24 hours.  A responsible adult is recommended to remain with you during that time.  It is normal to feel sleepy.  We encourage you to not do anything that requires balance, judgment or coordination.    MILD FLU-LIKE SYMPTOMS ARE NORMAL. YOU MAY EXPERIENCE GENERALIZED MUSCLE ACHES, THROAT IRRITATION, HEADACHE AND/OR SOME NAUSEA.    FOR 24 HOURS DO NOT:  Drive, operate machinery or run household appliances.  Drink beer or alcoholic beverages.   Make important decisions or sign legal documents.    SPECIAL INSTRUCTIONS: Post-Operative Instructions - Shoulder Arthroscopy     Dressing and wound care: Keep your shoulder dressing clean and dry after surgery.  Be aware that some leaking of blood or fluid from your dressing can occur and is normal. You may remove your dressing 3 days after the operation.  Notice that you have a single incision and/or several small incisions that have been closed with sutures. Cover each of these incisions with a light dressing or band-aids.  This keeps the surgical incisions clean, as well as preventing your clothes from spotting with blood or fluid.  Change band-aids or light dressing daily.     Shower / bathing: Keep the shoulder dry for 3 days after your surgery.  Then, you may shower. You may let soap and water run over skin incisions, but do not immerse your shoulder in water.  No swimming pools, hot tubs, or baths are recommended until at least 6 weeks after surgery.     Ice: Apply an ice pack to your shoulder (15 minutes on the shoulder, 15 minutes off the shoulder), as you feel necessary to help with the pain and swelling.   If you have a cold therapy, use liberally as directed.     Sling / Shoulder Immobilizer: The sling should be on except when bathing or doing your exercises.  You may also carefully remove the sling when awake and seated.  Be sure to support your forearm with a pillow so your shoulder remains in a relaxed and  comfortable position.  Please wear the sling while sleeping.     Activity: It is important to move your shoulder, as well as your elbow, wrist, and hand several times daily, starting the day after surgery.  You may do pendulum exercises with your operative arm starting the day after surgery.  Pendulum (dangling [place]) exercises are encouraged 3-5 times daily.  The sling will need to be removed for pendulum exercises.  You may loosen the sling to use your operative arm for light simple activities such as eating, brushing teeth, or writing/typing.  Nothing heavier than a cup of coffee in your hand on the operative side until otherwise recommended.     Pain medication: Take your pain medicine, as needed and prescribed.  Do not drive or operate machinery while taking narcotic pain medication.   You may start or resume anti-inflammatory medication (i.e. ibuprofen, naproxen) anytime after surgery, which should be taken with food to avoid stomach irritation.     Driving: You may drive as soon as you are off narcotic pain medication and feel comfortable behind the wheel.  Loosen or remove your sling, supporting your forearm on a pillow when you drive.  It is important for both hands to have access to the steering wheel for safety.     Aspirin: Take one low dose 81 mg aspirin starting the morning after surgery twice daily - one in the morning and one in the evening - for two weeks following surgery.       Problems:     If you are having problems with your shoulder (unexpected pain, excessive bleeding or discharge from your incisions, fevers/chills) do not hesitate to call the office or visit the nearest emergency room for evaluation.     Follow-up:     Make sure that you have an appointment 7-14 days following surgery.  Your stitches will be removed, and your procedure/rehabilitation will be discussed at that time.   Physical therapy may be prescribed at that time.         Keyana Correa MD   [facility] [place]    [phone]    DIET: To avoid nausea, slowly advance diet as tolerated, avoiding spicy or greasy foods for the first day.  Add more substantial food to your diet according to your physician's instructions.  Babies can be fed formula or breast milk as soon as they are hungry.  INCREASE FLUIDS AND FIBER TO AVOID CONSTIPATION.    You should CALL YOUR PHYSICIAN if you develop:  Fever greater than 101 degrees F.  Pain not relieved by medication, or persistent nausea or vomiting.  Excessive bleeding (blood soaking through dressing) or unexpected drainage from the wound.  Extreme redness or swelling around the incision site, drainage of pus or foul smelling drainage.  Inability to urinate or empty your bladder within 8 hours.  Problems with breathing or chest pain.    You should call 911 if you develop problems with breathing or chest pain.  If you are unable to contact your doctor or surgical center, you should go to the nearest emergency room or urgent care center.  Physician's telephone #: [phone]    If any questions arise, call your doctor.  If your doctor is not available, please feel free to call the Surgical Center at [phone].     A registered nurse may call you a few days after your surgery to see how you are doing after your procedure.    MEDICATIONS: Resume taking daily medication.  Take prescribed pain medication with food.  If no medication is prescribed, you may take non-aspirin pain medication if needed.  PAIN MEDICATION CAN BE VERY CONSTIPATING.  Take a stool softener or laxative such as senokot, pericolace, or milk of magnesia if needed.    Last pain medication given at .    If your physician has prescribed pain medication that includes Acetaminophen (Tylenol), do not take additional Acetaminophen (Tylenol) while taking the prescribed medication.        Peripheral Nerve Block Discharge Instructions from Same Day Surgery and Inpatient :    What to Expect - Upper Extremity  You may experience  "numbness and weakness in shoulder  on the same side as your surgery  This is normal. For some people, this may be an unpleasant sensation. Be very careful with your numb limb  Ask for help when you need it  Shoulder Surgery Side Effects  In addition to numbness and weakness you may experience other symptoms  Other nerves that are close to those nerves injected can also be affected by local anesthesia  You may experience a hoarseness in your voice  Your breathing may feel different  You may also notice drooping of your eyelid, pupil constriction, and decreased sweating, on the side of your surgery  All of these side effects are normal and will resolve when the local anesthetic wears off   Prevent Injury  Protect the limb like a baby  Beware of exposing your limb to extreme heat or cold or trauma  The limb may be injured without you noticing because it is numb  Keep the limb elevated whenever possible  Do not sleep on the limb  Change the position of the limb regularly  Avoid putting pressure on your surgical limb  Pain Control  The initial block on the day of surgery will make your extremity feel \"numb\"  Any consecutive injection including prior to discharge from the hospital will make your extremity feel \"numb\"  You may feel an aching or burning when the local anesthesia starts to wear off  Take pain pills as prescribed by your surgeon  Call your surgeon or anesthesiologist if you do not have adequate pain control        Peripheral Nerve Block Discharge Instructions from Same Day Surgery and Inpatient :    What to Expect - Upper Extremity  You may experience numbness and weakness in shoulder, arm, and hand  on the same side as your surgery  This is normal. For some people, this may be an unpleasant sensation. Be very careful with your numb limb  Ask for help when you need it  Shoulder Surgery Side Effects  In addition to numbness and weakness you may experience other symptoms  Other nerves that are close to those " "nerves injected can also be affected by local anesthesia  You may experience a hoarseness in your voice  Your breathing may feel different  You may also notice drooping of your eyelid, pupil constriction, and decreased sweating, on the side of your surgery  All of these side effects are normal and will resolve when the local anesthetic wears off   Prevent Injury  Protect the limb like a baby  Beware of exposing your limb to extreme heat or cold or trauma  The limb may be injured without you noticing because it is numb  Keep the limb elevated whenever possible  Do not sleep on the limb  Change the position of the limb regularly  Avoid putting pressure on your surgical limb  Pain Control  The initial block on the day of surgery will make your extremity feel \"numb\"  Any consecutive injection including prior to discharge from the hospital will make your extremity feel \"numb\"  You may feel an aching or burning when the local anesthesia starts to wear off  Take pain pills as prescribed by your surgeon  Call your surgeon or anesthesiologist if you do not have adequate pain control    "

## 2023-10-31 ENCOUNTER — HOSPITAL ENCOUNTER (OUTPATIENT)
Dept: CARDIOLOGY | Facility: MEDICAL CENTER | Age: 48
End: 2023-10-31
Attending: ANESTHESIOLOGY
Payer: COMMERCIAL

## 2023-10-31 DIAGNOSIS — Z01.810 PREOP CARDIOVASCULAR EXAM: Primary | ICD-10-CM

## 2023-10-31 PROCEDURE — 93005 ELECTROCARDIOGRAM TRACING: CPT

## 2023-11-01 ENCOUNTER — HOSPITAL ENCOUNTER (OUTPATIENT)
Dept: LAB | Facility: MEDICAL CENTER | Age: 48
End: 2023-11-01
Attending: ANESTHESIOLOGY
Payer: COMMERCIAL

## 2023-11-01 LAB
ANION GAP SERPL CALC-SCNC: 12 MMOL/L (ref 7–16)
BUN SERPL-MCNC: 11 MG/DL (ref 8–22)
CALCIUM SERPL-MCNC: 9.4 MG/DL (ref 8.4–10.2)
CHLORIDE SERPL-SCNC: 101 MMOL/L (ref 96–112)
CO2 SERPL-SCNC: 27 MMOL/L (ref 20–33)
CREAT SERPL-MCNC: 0.98 MG/DL (ref 0.5–1.4)
EKG IMPRESSION: NORMAL
GFR SERPLBLD CREATININE-BSD FMLA CKD-EPI: 95 ML/MIN/1.73 M 2
GLUCOSE SERPL-MCNC: 90 MG/DL (ref 65–99)
POTASSIUM SERPL-SCNC: 3.7 MMOL/L (ref 3.6–5.5)
SODIUM SERPL-SCNC: 140 MMOL/L (ref 135–145)

## 2023-11-01 PROCEDURE — 93010 ELECTROCARDIOGRAM REPORT: CPT | Performed by: INTERNAL MEDICINE

## 2023-11-01 PROCEDURE — 36415 COLL VENOUS BLD VENIPUNCTURE: CPT

## 2023-11-01 PROCEDURE — 80048 BASIC METABOLIC PNL TOTAL CA: CPT

## (undated) DEVICE — SLEEVE SHOULDER DISP(ARTHREX) - (6/BX)

## (undated) DEVICE — SODIUM CHL IRRIGATION 0.9% 1000ML (12EA/CA)

## (undated) DEVICE — CANISTER SUCTION 3000ML MECHANICAL FILTER AUTO SHUTOFF MEDI-VAC NONSTERILE LF DISP  (40EA/CA)

## (undated) DEVICE — GLOVE BIOGEL PI INDICATOR SZ 6.5 SURGICAL PF LF - (50/BX 4BX/CA)

## (undated) DEVICE — TUBE CONNECTING SUCTION - CLEAR PLASTIC STERILE 72 IN (50EA/CA)

## (undated) DEVICE — GOWN WARMING STANDARD FLEX - (30/CA)

## (undated) DEVICE — SET EXTENSION WITH 2 PORTS (48EA/CA) ***PART #2C8610 IS A SUBSTITUTE*****

## (undated) DEVICE — KIT ANESTHESIA W/CIRCUIT & 3/LT BAG W/FILTER (20EA/CA)

## (undated) DEVICE — PROTECTOR ULNA NERVE - (36PR/CA)

## (undated) DEVICE — DRAPE IOBAN II INCISE 23X17 - (10EA/BX 4BX/CA)

## (undated) DEVICE — SENSOR OXIMETER ADULT SPO2 RD SET (20EA/BX)

## (undated) DEVICE — ELECTRODE DUAL RETURN W/ CORD - (50/PK)

## (undated) DEVICE — PAD BABY LAP 4X18 W/O - RINGS PREWASHED 5/PK 40PK/CS

## (undated) DEVICE — GLOVE SZ 6 BIOGEL PI MICRO - PF LF (50PR/BX 4BX/CA)

## (undated) DEVICE — GLOVE SZ 7.5 BIOGEL PI MICRO - PF LF (50PR/BX)

## (undated) DEVICE — TUBING CLEARLINK DUO-VENT - C-FLO (48EA/CA)

## (undated) DEVICE — ABLATOR WAND SERFAS 90-S CRUISE

## (undated) DEVICE — SPLINT REUTER BI-VALVE NASAL (5EA/PK)

## (undated) DEVICE — PACK MAJOR BASIN - (2EA/CA)

## (undated) DEVICE — CATHETER IV 20 GA X 1-1/4 ---SURG.& SDS ONLY--- (50EA/BX)

## (undated) DEVICE — GOWN SURGEONS X-LARGE - DISP. (30/CA)

## (undated) DEVICE — GLOVE BIOGEL PI INDICATOR SZ 7.0 SURGICAL PF LF - (50/BX 4BX/CA)

## (undated) DEVICE — TUBING CASSETTE CROSSFLOW INTEGRATED (10EA/CA)

## (undated) DEVICE — SPLINT INTRANASAL STERILE POSISEP X 0.6IN X 2.0IN (5EA/PK)

## (undated) DEVICE — ELECTRODE 850 FOAM ADHESIVE - HYDROGEL RADIOTRNSPRNT (50/PK)

## (undated) DEVICE — GLOVE BIOGEL SZ 6.5 SURGICAL PF LTX (50PR/BX 4BX/CA)

## (undated) DEVICE — SOD. CHL. INJ. 0.9% 250 ML - (36/CA 50CA/PF)

## (undated) DEVICE — SYRINGE ASEPTO - (50EA/CA

## (undated) DEVICE — LACTATED RINGERS INJ 1000 ML - (14EA/CA 60CA/PF)

## (undated) DEVICE — TUBE E-T HI-LO CUFF 7.0MM (10EA/PK)

## (undated) DEVICE — TUBING ENDOSCRUB II (5EA/PK)

## (undated) DEVICE — SUTURE 6-0 PROLENE P-3 - (12/BX)

## (undated) DEVICE — CANNULA FULLY THREADED 8 X 90 (5EA/BX)

## (undated) DEVICE — MASK ANESTHESIA ADULT  - (100/CA)

## (undated) DEVICE — NEPTUNE 4 PORT MANIFOLD - (20/PK)

## (undated) DEVICE — PEN SKIN MARKER W/RULER - (50EA/BX)

## (undated) DEVICE — SPECIMEN PLASTIC CONVERTOR - (10/CA)

## (undated) DEVICE — SENSOR SPO2 NEO LNCS ADHESIVE (20/BX) SEE USER NOTES

## (undated) DEVICE — TUBE CONNECT SUCTION CLEAR 120 X 1/4" (50EA/CA)"

## (undated) DEVICE — GLOVE BIOGEL INDICATOR SZ 7SURGICAL PF LTX - (50/BX 4BX/CA)

## (undated) DEVICE — GLOVE, LITE (PAIR)

## (undated) DEVICE — SUTURE 0 NUROLON CT-2 (12PK/BX)

## (undated) DEVICE — SUTURE 3-0 ETHILON FS-1 - (36/BX) 30 INCH

## (undated) DEVICE — CLOSURE SKIN STRIP 1/2 X 4 IN - (STERI STRIP) (50/BX 4BX/CA)

## (undated) DEVICE — GLOVE BIOGEL SZ 8.5 SURGICAL PF LTX - (50PR/BX 4BX/CA)

## (undated) DEVICE — NEEDLE SPINAL NON-SAFETY 25GA X 3 (25EA/BX)"

## (undated) DEVICE — KIT  I.V. START (100EA/CA)

## (undated) DEVICE — BOVIE FOOT CONTROL SUCTION - 6IN 10FR (25EA/CA)

## (undated) DEVICE — CORDS BIPOLAR COAGULATION - 12FT STERILE DISP. (10EA/BX)

## (undated) DEVICE — GLOVE BIOGEL SZ 7 SURGICAL PF LTX - (50PR/BX 4BX/CA)

## (undated) DEVICE — DRAPE U SPLIT IMP 54 X 76 - (24/CA)

## (undated) DEVICE — Device

## (undated) DEVICE — ELECTRODE ACROMIOPLASTY CNCPT (5/BX)

## (undated) DEVICE — SPONGE RADIOPAQUE CTN X-LG - STERILE (50PK/CA) MADE TO ORDER ITEM AND HAS A 4-6 WEEK LEAD TIME

## (undated) DEVICE — TOWELS CLOTH SURGICAL - (4/PK 20PK/CA)

## (undated) DEVICE — TUBE NG SALEM SUMP 16FR (50EA/CA)

## (undated) DEVICE — DRAPE LAPAROTOMY T SHEET - (12EA/CA)

## (undated) DEVICE — GLOVE BIOGEL INDICATOR SZ 7.5 SURGICAL PF LTX - (50PR/BX 4BX/CA)

## (undated) DEVICE — PAD PROVIDONE IODINE 2-1/8X1 (100EA/BX)

## (undated) DEVICE — TOWEL STOP TIMEOUT SAFETY FLAG (40EA/CA)

## (undated) DEVICE — SUTURE GENERAL

## (undated) DEVICE — GOWN SURGEONS LARGE - (32/CA)

## (undated) DEVICE — SUTURE 4-0 CHROMIC GUT - 18 INCH G-3 D/A (12/BX)

## (undated) DEVICE — TRACKER ENT PATIENT

## (undated) DEVICE — SUCTION INSTRUMENT YANKAUER BULBOUS TIP W/O VENT (50EA/CA)

## (undated) DEVICE — SYRINGE 10 ML CONTROL LL (25EA/BX 4BX/CA)

## (undated) DEVICE — SET LEADWIRE 5 LEAD BEDSIDE DISPOSABLE ECG (1SET OF 5/EA)

## (undated) DEVICE — HEAD HOLDER JUNIOR/ADULT

## (undated) DEVICE — ADHESIVE MASTISOL - (48/BX)

## (undated) DEVICE — SLEEVE, VASO, THIGH, MED

## (undated) DEVICE — SUTURE 6-0 ETHILON P-1 18 (12PK/BX)"

## (undated) DEVICE — GLOVE 7.0 LF PF PROTEXIS (50PR/BX)

## (undated) DEVICE — CHLORAPREP 26 ML APPLICATOR - ORANGE TINT(25/CA)

## (undated) DEVICE — DRAPE SHOULDER FLUID CONTROL - 77 X 85 (10/CA)

## (undated) DEVICE — PATTIES SURG X-RAYCOTTONOID - 1/2 X 3 IN (200/CA)

## (undated) DEVICE — PACK SHOULDER ARTHROSCOPY SM - (2EA/CA)

## (undated) DEVICE — CANNULA THREADED 5X75 (5EA/BX)

## (undated) DEVICE — KIT SURGIFLO W/OUT THROMBIN - (6EA/CA)

## (undated) DEVICE — SHAVER4.0 AGGRESSIVE + FORMLA (5EA/BX)

## (undated) DEVICE — TRACKER ENT INSTRUMENT

## (undated) DEVICE — GLOVE BIOGEL SZ 7.5 SURGICAL PF LTX - (50PR/BX 4BX/CA)

## (undated) DEVICE — WIPE INSTRUMENT MICROWIPE (20EA/SP)

## (undated) DEVICE — SPONGE GAUZESTER. 2X2 4-PL - (2/PK 50PK/BX 30BX/CS)

## (undated) DEVICE — CANISTER SUCTION RIGID RED 1500CC (40EA/CA)

## (undated) DEVICE — SODIUM CHL. IRRIGATION 0.9% 3000ML (4EA/CA 65CA/PF)

## (undated) DEVICE — SUTURE 3-0 VICRYL PLUS SH - 8X 18 INCH (12/BX)

## (undated) DEVICE — KIT ROOM DECONTAMINATION

## (undated) DEVICE — PACK ENT OR - (2EA/CA)

## (undated) DEVICE — DRESSING MASS EYE & EAR SIZE 2 (2EA/PK  50PK/BX  100EA/BX)

## (undated) DEVICE — TUBING DAY USE W/CARTRIDGE (10EA/BX)

## (undated) DEVICE — SUTURE 3-0 VICRYL PLUS - 12 X 18 INCH (12/BX)

## (undated) DEVICE — SUTURE 4-0 PLAIN GUT SC-1 ETHICON (36PK/BX)

## (undated) DEVICE — ANTI-FOG SOLUTION - 60BTL/CA

## (undated) DEVICE — SHEATH SHARP SITE 30 DEGREE ENDOSCRUB II (5EA/PK)

## (undated) DEVICE — PACK ARTHROSCOPY - (2EA//CA)

## (undated) DEVICE — CUSHION EAR E-Z WRAP NASAL CANNULA - (25/CA)

## (undated) DEVICE — BLADE SURGICAL #11 - (50/BX)

## (undated) DEVICE — APPLICATOR COTTON TIP 6 IN - STERILE (2EA/PK 100PK/BX)

## (undated) DEVICE — SPONGE GAUZESTER 4 X 4 4PLY - (128PK/CA)

## (undated) DEVICE — NEEDLE NON SAFETY HYPO 22 GA X 1 1/2 IN (100/BX)

## (undated) DEVICE — GOWN SURGICAL X-LARGE ULTRA - FILM-REINFORCED (20/CA)

## (undated) DEVICE — DENTAL ROLL 1-1/2 X 3/8 (5EA/PK 50PK/CA)

## (undated) DEVICE — SHAVER, 5.5 RESECTOR

## (undated) DEVICE — SUTURE 4-0 MONOCRYL PLUSPC-3 - 18 INCH (12/BX)